# Patient Record
Sex: FEMALE | Race: WHITE | Employment: UNEMPLOYED | URBAN - METROPOLITAN AREA
[De-identification: names, ages, dates, MRNs, and addresses within clinical notes are randomized per-mention and may not be internally consistent; named-entity substitution may affect disease eponyms.]

---

## 2017-01-01 ENCOUNTER — HOSPITAL ENCOUNTER (OUTPATIENT)
Dept: OTHER | Age: 52
Discharge: HOME | End: 2017-01-31
Attending: ANESTHESIOLOGY | Admitting: ANESTHESIOLOGY

## 2017-01-06 ENCOUNTER — HOSPITAL ENCOUNTER (OUTPATIENT)
Dept: PHYSICAL THERAPY | Age: 52
Discharge: HOME | End: 2017-01-06

## 2017-01-18 ENCOUNTER — HOSPITAL ENCOUNTER (OUTPATIENT)
Dept: PHYSICAL THERAPY | Age: 52
Discharge: HOME | End: 2017-01-18

## 2017-01-24 ENCOUNTER — HOSPITAL ENCOUNTER (OUTPATIENT)
Dept: PHYSICAL THERAPY | Age: 52
Discharge: HOME | End: 2017-01-24

## 2017-01-31 ENCOUNTER — HOSPITAL ENCOUNTER (OUTPATIENT)
Dept: PHYSICAL THERAPY | Age: 52
Discharge: HOME | End: 2017-01-31

## 2017-02-01 ENCOUNTER — HOSPITAL ENCOUNTER (OUTPATIENT)
Dept: OTHER | Age: 52
Discharge: HOME | End: 2017-02-28
Attending: ANESTHESIOLOGY | Admitting: ANESTHESIOLOGY

## 2017-02-03 ENCOUNTER — HOSPITAL ENCOUNTER (OUTPATIENT)
Dept: PHYSICAL THERAPY | Age: 52
Discharge: HOME | End: 2017-02-03

## 2017-03-01 ENCOUNTER — HOSPITAL ENCOUNTER (OUTPATIENT)
Dept: OTHER | Age: 52
Discharge: HOME | End: 2017-03-31
Attending: ANESTHESIOLOGY | Admitting: ANESTHESIOLOGY

## 2017-03-29 ENCOUNTER — HOSPITAL ENCOUNTER (OUTPATIENT)
Dept: PHYSICAL THERAPY | Age: 52
Discharge: HOME | End: 2017-03-29

## 2017-03-31 ENCOUNTER — HOSPITAL ENCOUNTER (OUTPATIENT)
Dept: PHYSICAL THERAPY | Age: 52
Discharge: HOME | End: 2017-03-31

## 2017-04-05 ENCOUNTER — HOSPITAL ENCOUNTER (OUTPATIENT)
Dept: PHYSICAL THERAPY | Age: 52
Discharge: HOME | End: 2017-04-05

## 2017-04-07 ENCOUNTER — HOSPITAL ENCOUNTER (OUTPATIENT)
Dept: PHYSICAL THERAPY | Age: 52
Discharge: HOME | End: 2017-04-07

## 2017-04-12 ENCOUNTER — HOSPITAL ENCOUNTER (OUTPATIENT)
Dept: PHYSICAL THERAPY | Age: 52
Discharge: HOME | End: 2017-04-12

## 2017-04-21 ENCOUNTER — HOSPITAL ENCOUNTER (OUTPATIENT)
Dept: PHYSICAL THERAPY | Age: 52
Discharge: HOME | End: 2017-04-21
Admitting: ANESTHESIOLOGY

## 2017-04-26 ENCOUNTER — HOSPITAL ENCOUNTER (OUTPATIENT)
Dept: PHYSICAL THERAPY | Age: 52
Discharge: HOME | End: 2017-04-26
Admitting: ANESTHESIOLOGY

## 2017-04-28 ENCOUNTER — HOSPITAL ENCOUNTER (OUTPATIENT)
Dept: PHYSICAL THERAPY | Age: 52
Discharge: HOME | End: 2017-04-28
Admitting: ANESTHESIOLOGY

## 2017-05-01 ENCOUNTER — HOSPITAL ENCOUNTER (OUTPATIENT)
Dept: OTHER | Age: 52
Discharge: HOME | End: 2017-05-31
Attending: ANESTHESIOLOGY | Admitting: ANESTHESIOLOGY

## 2017-05-05 ENCOUNTER — HOSPITAL ENCOUNTER (OUTPATIENT)
Dept: PHYSICAL THERAPY | Age: 52
Discharge: HOME | End: 2017-05-05

## 2017-05-31 ENCOUNTER — HOSPITAL ENCOUNTER (OUTPATIENT)
Dept: PHYSICAL THERAPY | Age: 52
Discharge: HOME | End: 2017-05-31

## 2017-06-06 ENCOUNTER — HOSPITAL ENCOUNTER (OUTPATIENT)
Dept: PHYSICAL THERAPY | Age: 52
Discharge: HOME | End: 2017-06-06

## 2017-06-13 ENCOUNTER — HOSPITAL ENCOUNTER (OUTPATIENT)
Dept: PHYSICAL THERAPY | Age: 52
Discharge: HOME | End: 2017-06-13

## 2017-06-16 ENCOUNTER — HOSPITAL ENCOUNTER (OUTPATIENT)
Dept: PHYSICAL THERAPY | Age: 52
Discharge: HOME | End: 2017-06-16

## 2017-06-21 ENCOUNTER — HOSPITAL ENCOUNTER (OUTPATIENT)
Dept: PHYSICAL THERAPY | Age: 52
Discharge: HOME | End: 2017-06-21

## 2017-06-23 ENCOUNTER — HOSPITAL ENCOUNTER (OUTPATIENT)
Dept: PHYSICAL THERAPY | Age: 52
Discharge: HOME | End: 2017-06-23
Admitting: ANESTHESIOLOGY

## 2017-06-27 ENCOUNTER — HOSPITAL ENCOUNTER (OUTPATIENT)
Dept: PHYSICAL THERAPY | Age: 52
Discharge: HOME | End: 2017-06-27

## 2017-07-01 ENCOUNTER — HOSPITAL ENCOUNTER (OUTPATIENT)
Dept: OTHER | Age: 52
Discharge: HOME | End: 2017-07-31
Attending: ANESTHESIOLOGY | Admitting: ANESTHESIOLOGY

## 2017-07-05 ENCOUNTER — HOSPITAL ENCOUNTER (OUTPATIENT)
Dept: PHYSICAL THERAPY | Age: 52
Discharge: HOME | End: 2017-07-05

## 2017-07-07 ENCOUNTER — HOSPITAL ENCOUNTER (OUTPATIENT)
Dept: PHYSICAL THERAPY | Age: 52
Discharge: HOME | End: 2017-07-07

## 2017-08-01 ENCOUNTER — HOSPITAL ENCOUNTER (OUTPATIENT)
Dept: PHYSICAL THERAPY | Age: 52
Discharge: HOME | End: 2017-08-01

## 2017-08-08 ENCOUNTER — HOSPITAL ENCOUNTER (OUTPATIENT)
Dept: PHYSICAL THERAPY | Age: 52
Discharge: HOME | End: 2017-08-08

## 2017-08-11 ENCOUNTER — HOSPITAL ENCOUNTER (OUTPATIENT)
Dept: PHYSICAL THERAPY | Age: 52
Discharge: HOME | End: 2017-08-11

## 2017-08-14 ENCOUNTER — HOSPITAL ENCOUNTER (OUTPATIENT)
Dept: PHYSICAL THERAPY | Age: 52
Discharge: HOME | End: 2017-08-14

## 2017-08-18 ENCOUNTER — HOSPITAL ENCOUNTER (OUTPATIENT)
Dept: PHYSICAL THERAPY | Age: 52
Discharge: HOME | End: 2017-08-18

## 2017-08-22 ENCOUNTER — HOSPITAL ENCOUNTER (OUTPATIENT)
Dept: PHYSICAL THERAPY | Age: 52
Discharge: HOME | End: 2017-08-22

## 2017-08-25 ENCOUNTER — HOSPITAL ENCOUNTER (OUTPATIENT)
Dept: PHYSICAL THERAPY | Age: 52
Discharge: HOME | End: 2017-08-25

## 2017-09-01 ENCOUNTER — HOSPITAL ENCOUNTER (OUTPATIENT)
Dept: OTHER | Age: 52
Discharge: HOME | End: 2017-09-30
Attending: ANESTHESIOLOGY | Admitting: ANESTHESIOLOGY

## 2017-09-05 ENCOUNTER — HOSPITAL ENCOUNTER (OUTPATIENT)
Dept: PHYSICAL THERAPY | Age: 52
Discharge: HOME | End: 2017-09-05

## 2017-09-19 ENCOUNTER — HOSPITAL ENCOUNTER (OUTPATIENT)
Dept: PHYSICAL THERAPY | Age: 52
Discharge: HOME | End: 2017-09-19

## 2017-09-26 ENCOUNTER — HOSPITAL ENCOUNTER (OUTPATIENT)
Dept: PHYSICAL THERAPY | Age: 52
Discharge: HOME | End: 2017-09-26

## 2017-11-01 ENCOUNTER — HOSPITAL ENCOUNTER (OUTPATIENT)
Dept: OTHER | Age: 52
Discharge: HOME | End: 2017-11-30
Attending: ANESTHESIOLOGY | Admitting: ANESTHESIOLOGY

## 2017-11-03 ENCOUNTER — HOSPITAL ENCOUNTER (OUTPATIENT)
Dept: PHYSICAL THERAPY | Age: 52
Discharge: HOME | End: 2017-11-03

## 2017-11-03 NOTE — PROGRESS NOTES
Outpatient Physical Therapy     [x] Daily Treatment Note   [] Progress Note -   , 17 . 17,  10/6/17, 11/3/17   Bottom of this page. [] Discharge Note      Date:  11/3/2017        Patient Name:  Leo Taylor        :  1965    Restrictions/Precautions:      Diagnosis:  Complex pain syndrome. Treatment Diagnosis:  Same, RSD, decreased ability to walk  Insurance/Certification information:  [facility] has approval for 8 weeks- 16 visits                                                          Referring Physician:  Dr. Kandi Mirza of care signed (Y/N):      Visit# / total visits:              Authorized from:    10/17/17-            Pain level: 5-6/10 neck when lying down      10 neck, with walking, ADLs       6/10  Constant left arm(entire  Arm)    Subjective: 11/3/17 pt returns to therapy today. Has approval for 24 visit s. Pain level is high today. 10/6/17 reports that she is more sore all over- perhaps due to the poor weather moving in.   10/3/17 her mouth is much better. She has 1 more approved visit after today- will wait on prog note til that day. 17 her mouth is still very sore but slowly improving  17 states she fell walking in the grass yesterday but was able to get up herself. Has had upper teeth extracted- very sore mouth  6/10 pain scale. 17 doing OK . About the sa ME. Going to the dentis today,having her teeth pulled. Thirsty and hungry from no food or water since midnight. 17 feeling better. No c/o sharp neck pain. 17 still having some sharp pain from her left shoulder up her neck but not daily. She has been to the dentist. (pleased)  17 having nichole[p pain from her left shoulder up to her neck yesterday. No reason. Severe stormy weekend ahead- weather? Have also been doing more cervical strengthening for posture and gait. Pain   Patient describes pain to be in her left arm, upper back and neck.   Patient reports Pain scale 4-6/10  . She has an implanted TENS unit for pain. Worsened by - stays the same most of the time  Improved by - lying on the couch     Social History/Environment:   Patient lives with .her sister  . . in a 1 story house with 1 step in the garage    Prior Level of Function:    has been very limited for the past 5 years. Has help with bathing. She is able to dress herself. She is able to walk about 20 ft with a walker. Sits in a recliner. Uses a walker to get to the bathroom.      Functional Complaints/ Current Level of Function:   Patient unable to .walk very far, states she has poor balance.      Patient goal for therapy:        11/3/17Be able to hold my head up more and walk better and not turn my foot in as much          Exercises:   Exercise/Equipment Resistance/Repetitions Other comments    12/19/16 cerv rot left  x5      Head,sh, elbow presses Hold 5 sec  X 5      Ankle- DF/PF  INV/EV 2x10     12/21/16 bridges           SLR       Dorsi flx       Heel raise     12/28/16 seated- core work- upper andlower trunk, cue for head position constantly , sit to stand, unlock the left knee, shift to the left 30 min      Manual- see below 15 min      gait   15 min  Attempted st cane- not able due to fear of falling Will work in llbars   Left foot- seated active eversion    2x10 and against resistance. 1/2/17-1/6/17 gait-llbars    30 min Foot placement, posture, head position, wt shift Feet apart, left foot out.     1/10/17-1/13/17 gait in ll bars 15 min          SLR, abd, heel raise 3x5 work on midline with head and left toe position. scap retraction, depression Sets of 5 x 5      Side step ups-left 2x10      1/24/17 sidelying left cerv side lift, abd of hip, and eversion of left ankle All 3x 5     3/31/17-4/14/17 forward and lateral step ups to the left   x30 with cues for wt shift, able to step forward without hands x 5 reps.  Working on shifting wt left   Standing side leg raise and backwd rsies 3x5 4/5/17 bridges, bicycle legs, single leg bridge 2x10 each, given HO for HEP    4/19/17 outdoor walking on concrete, gravel,grass 300 ft x 2. SB asst, no cane. Only used cane on the gravel and grass (20 ft) A few stops to get centered and reposition head   4/28/17 combined step ups- 4-6 8 inch forward and step up15 min  and overs without UE support, doing saumya pad steps Working on wt shift to the left. Seated on therapy ball-wt shift, knee lift, low kick, arm raise, trunk co-contraction x10-20 reps each    Ambulating on the sidewalk 200 ft            Left foot eversion with red 3x10    5/5/17 HEP: turn circles in 4 sec, single stand, tandem stand 2x day   gvien HO     11/3/17 step ups- no hands6 in  3 in a row, many times      Turn her left foot into eversion Sets of 10 x 3    sidelying -lateral neck lifts 3x10    Prone cerv ext 3x10    Prone leg ext, then with R arm raise 3x10    Prone plank NV     . Prone knee flx to stretch ant thigh Hold 10 sec  5-7 each leg. Left more limited. Prone 4 pt   Elbows ad knees 2x10    High kneeling to stand bialt                  Therapeutic Exercise: 30 min closed chain, prone ex                   1/24/17  30 min. PNF LE diagonals with left leg. Worked on ER, stretching. Group Therapy:      Home Exercise Program:       Therapeutic Activity:      Neuromuscular Re-education:  15 min balance     e.  3/29/17 Mir balance test performed. Score= 47 ( improved 3 points). 41-50 range is low risk of falling. 21-40 is moderate risk. Pt scored 16/28 on the second page of skills (higher level)    Gait:    10/6/17    min   Working on posture, head position,  and foot postiion. Side to side, without AD.     amaabulating in sock feet- performs HS with her left but does not get to FF in order to push off her toe, instead her toes flex and her foot is drawn into inversion. Will work more in her sock feet. Fast walking in ll bars, mechnaics of jumping in ll bars. Walking Subjective:      she is ambulating without her cane indoors. She is able to walk inside stores with her cane for short trips. She is going to try to use the grocery cart at the store instead of the w/c or electric cart.  (shopping cart at [employer], electric cart at [employer])  States she would like to have better posture with her head( constant side flexed and rot R)  She would like to be more independent with her own grooming (fixing her hair) and medications. Objective:  11/3/17 improving head position and left foot position but her abnormal tone in her left foot overrides her ability to control it. I believe she will benefit from an AFO to make her more stable and independent. appled tape to her cervical spine today to offer a cue for imporved head palcment- appeared beneficial.           9/1/17 improving head osition and left foot position- still requires many cues- verble and tactile for good walking posture, othewise returns to left foot inversion and cerv rot R/SB. 11/3/17 murphy balance test: 50/56. Low risk of falls. Scores low on tandem stand and single leg stand,  Has poor head position with walking (rot R and SB R), inversion of her left foot both due to the RSD. Minimal to no use of her left arm. Assessment:  G Code: [id number]   Summary:  .  Just starting therapy again after a 1 month break,  Patient's response to treatment: good. Works extremely hard,  She is pleased with her progress. .. She would like to eventually return to her own home in the near future. Goals:  G code :    Short Term Goals ( 4 weeks) Long Term Goals ( 8 weeks)   1). Establish HEP 1). (I) HEP   2). imporive cervical position 35% 2). improve cervical position  75%   3). cervical strength to 4- 3). cervical strength to 4+   4). tandem stand left 30 sec  Right 20 sec. Single stnd 5 sec 4). good balance. Murphy score to 53/56   5). Ambulate for 10 min continuously 5). ambulate continuously for 20 min   6). Fit with AFO 6). increase activiy  75%         Progress toward previous goals:    STG 1,2, 4 (partially)5 met. LTG  Not met. ·   ·   · Plan:    Continue 2x wk for 24 visits.     Electronically Signed by: Zoila Good, [id number]

## 2017-11-07 ENCOUNTER — HOSPITAL ENCOUNTER (OUTPATIENT)
Dept: PHYSICAL THERAPY | Age: 52
Discharge: HOME | End: 2017-11-07

## 2017-11-07 NOTE — FLOWSHEET NOTE
neck.  Patient reports Pain scale 4-6/10  . She has an implanted TENS unit for pain. Worsened by - stays the same most of the time  Improved by - lying on the couch     Social History/Environment:   Patient lives with .her sister  . . in a 1 story house with 1 step in the garage    Prior Level of Function:    has been very limited for the past 5 years. Has help with bathing. She is able to dress herself. She is able to walk about 20 ft with a walker. Sits in a recliner. Uses a walker to get to the bathroom.      Functional Complaints/ Current Level of Function:   Patient unable to .walk very far, states she has poor balance.      Patient goal for therapy:        11/3/17Be able to hold my head up more and walk better and not turn my foot in as much          Exercises:   Exercise/Equipment Resistance/Repetitions Other comments    12/19/16 cerv rot left  x5      Head,sh, elbow presses Hold 5 sec  X 5      Ankle- DF/PF  INV/EV 2x10     12/21/16 bridges           SLR       Dorsi flx       Heel raise     12/28/16 seated- core work- upper andlower trunk, cue for head position constantly , sit to stand, unlock the left knee, shift to the left 30 min      Manual- see below 15 min      gait   15 min  Attempted st cane- not able due to fear of falling Will work in llbars   Left foot- seated active eversion    2x10 and against resistance. 1/2/17-1/6/17 gait-llbars    30 min Foot placement, posture, head position, wt shift Feet apart, left foot out.     1/10/17-1/13/17 gait in ll bars 15 min          SLR, abd, heel raise 3x5 work on midline with head and left toe position. scap retraction, depression Sets of 5 x 5      Side step ups-left 2x10      1/24/17 sidelying left cerv side lift, abd of hip, and eversion of left ankle All 3x 5     3/31/17-4/14/17 forward and lateral step ups to the left   x30 with cues for wt shift, able to step forward without hands x 5 reps.  Working on shifting wt left   Standing side leg raise and backwd rsies 3x5    4/5/17 bridges, bicycle legs, single leg bridge 2x10 each, given HO for HEP    4/19/17 outdoor walking on concrete, gravel,grass 300 ft x 2. SB asst, no cane. Only used cane on the gravel and grass (20 ft) A few stops to get centered and reposition head   4/28/17 combined step ups- 4-6 8 inch forward and step up15 min  and overs without UE support, doing saumya pad steps Working on wt shift to the left. Seated on therapy ball-wt shift, knee lift, low kick, arm raise, trunk co-contraction x10-20 reps each    Ambulating on the sidewalk 200 ft            Left foot eversion with red 3x10    5/5/17 HEP: turn circles in 4 sec, single stand, tandem stand 2x day   gvien HO     11/3/17 step ups- no hands6 in  3 in a row, many times      Turn her left foot into eversion Sets of 10 x 3    sidelying -lateral neck lifts 3x10    Prone cerv ext 3x10    Prone leg ext, then with R arm raise 3x10    Prone plank NV     . Prone knee flx to stretch ant thigh Hold 10 sec  5-7 each leg. Left more limited. Prone 4 pt   Elbows ad knees 2x10    High kneeling to stand bialt                  Therapeutic Exercise:  30 min closed chain, prone ex                   1/24/17  30 min. PNF LE diagonals with left leg. Worked on ER, stretching. Group Therapy:      Home Exercise Program:       Therapeutic Activity:      Neuromuscular Re-education:        e.  3/29/17 Mri balance test performed. Score= 47 ( improved 3 points). 41-50 range is low risk of falling. 21-40 is moderate risk. Pt scored 16/28 on the second page of skills (higher level)    Gait:    min   Working on posture, head position,  and foot postiion. Side to side, without AD.     amaabulating in sock feet- performs HS with her left but does not get to FF in order to push off her toe, instead her toes flex and her foot is drawn into inversion. Will work more in her sock feet.     Fast walking in ll bars, mechnaics of jumping in ll bars.Walking without cane- imporved head and pelvis position. Used eversion dynamic strap to help inversion position. 17 15 min working on asymmetry of pelvis and shoudlers- her left hip is retracted with her upper trunk and shoulders rotated to the right with cervical rot to the R.      Manual Therapy:     17     30min costotransv jt mobiliz bilat in supine with pistol tech,cervical distraction, stretcing, mobiliz for rot, M-F release for left trunk and axilla.,,         17  30 min manual cerv distraction, stretching, PROM, 1st rib, thoracic stretch,  sttretcing and M-F release with sidelying over the red therapy ball. .        Towel roll stretch x 3 horiz and 1 verticle       Modalities:      Timed Code Treatment Minutes:             60    Total Treatment Minutes:              70         Treatment/Activity Tolerance:    [x] Patient tolerated treatment well [] Patient limited by fatigue   [x] Patient limited by pain- left shoulder and elbow/forearm. [x] Patient limited by other medical complications-RSD  [] Other:     Prognosis: [x] Good [] Fair  [] Poor    Patient Requires Follow-up:  [x] Yes  [] No    Plan: [x] Continue per plan of care -    [] Alter current plan (see comments)   [] Plan of care initiated [] Hold pending MD visit [] Discharge          See Progress Note: [x] Yes  [] No       Next due:           Electronically signed by:  Helen Parekh [address] [place] [address]      Outpatient Physical Therapy  Progress Note    Date:  2017    Patient Name:  Tulio Weston      :  1965    Restrictions/Precautions:      Diagnosis:  Complex pain syndrome    Treatment Diagnosis:  Same, decreased ability to walk, poor balance, weakness    Insurance/Certification information: [facility]   24 visits approved       Referring Physician:  Dr. Wilfredo Escobar of care signed (Y/N):      Visit# / total visits:           Pain level:  5 /10 left arm.     Progress Note covers period from:   11/3/17 To date on

## 2017-11-14 ENCOUNTER — HOSPITAL ENCOUNTER (OUTPATIENT)
Dept: PHYSICAL THERAPY | Age: 52
Discharge: HOME | End: 2017-11-14

## 2017-11-14 NOTE — FLOWSHEET NOTE
posture and gait. Pain   Patient describes pain to be in her left arm, upper back and neck. Patient reports Pain scale 4-6/10  . She has an implanted TENS unit for pain. Worsened by - stays the same most of the time  Improved by - lying on the couch     Social History/Environment:   Patient lives with .her sister  . . in a 1 story house with 1 step in the garage    Prior Level of Function:    has been very limited for the past 5 years. Has help with bathing. She is able to dress herself. She is able to walk about 20 ft with a walker. Sits in a recliner. Uses a walker to get to the bathroom.      Functional Complaints/ Current Level of Function:   Patient unable to .walk very far, states she has poor balance.      Patient goal for therapy:        11/3/17Be able to hold my head up more and walk better and not turn my foot in as much          Exercises:   Exercise/Equipment Resistance/Repetitions Other comments    12/19/16 cerv rot left  x5      Head,sh, elbow presses Hold 5 sec  X 5      Ankle- DF/PF  INV/EV 2x10     12/21/16 bridges           SLR       Dorsi flx       Heel raise     12/28/16 seated- core work- upper andlower trunk, cue for head position constantly , sit to stand, unlock the left knee, shift to the left 30 min      Manual- see below 15 min      gait   15 min  Attempted st cane- not able due to fear of falling Will work in llbars   Left foot- seated active eversion    2x10 and against resistance. 1/2/17-1/6/17 gait-llbars    30 min Foot placement, posture, head position, wt shift Feet apart, left foot out.     1/10/17-1/13/17 gait in ll bars 15 min          SLR, abd, heel raise 3x5 work on midline with head and left toe position.         scap retraction, depression Sets of 5 x 5      Side step ups-left 2x10      1/24/17 sidelying left cerv side lift, abd of hip, and eversion of left ankle All 3x 5     3/31/17-4/14/17 forward and lateral step ups to the left   x30 with cues for wt shift, able to step forward without hands x 5 reps. Working on shifting wt left   Standing side leg raise and backwd rsies 3x5    4/5/17 bridges, bicycle legs, single leg bridge 2x10 each, given HO for HEP    4/19/17 outdoor walking on concrete, gravel,grass 300 ft x 2. SB asst, no cane. Only used cane on the gravel and grass (20 ft) A few stops to get centered and reposition head   4/28/17 combined step ups- 4-6 8 inch forward and step up15 min  and overs without UE support, doing saumya pad steps Working on wt shift to the left. Seated on therapy ball-wt shift, knee lift, low kick, arm raise, trunk co-contraction x10-20 reps each    Ambulating on the sidewalk 200 ft            Left foot eversion with red 3x10    5/5/17 HEP: turn circles in 4 sec, single stand, tandem stand 2x day   gvien HO     11/3/17 step ups- no hands6 in  3 in a row, many times      Turn her left foot into eversion Sets of 10 x 3    sidelying -lateral neck lifts 3x10    Prone cerv ext 3x10    Prone leg ext, then with R arm raise 3x10    Prone plank NV     . Prone knee flx to stretch ant thigh Hold 10 sec  5-7 each leg. Left more limited. Prone 4 pt   Elbows ad knees 2x10    High kneeling to stand bialt                  Therapeutic Exercise:   15 min  , prone ex                   1/24/17  30 min. PNF LE diagonals with left leg. Worked on ER, stretching. Group Therapy:      Home Exercise Program:       Therapeutic Activity:      Neuromuscular Re-education:        e.  3/29/17 Mir balance test performed. Score= 47 ( improved 3 points). 41-50 range is low risk of falling. 21-40 is moderate risk. Pt scored 16/28 on the second page of skills (higher level)    Gait:   15 min   Working on posture, head position,  and foot postiion.  Side to side, circles, forw and backwd,without AD.     amaabulating in sock feet- performs HS with her left but does not get to FF in order to push off her toe, instead her toes flex and her foot is drawn into inversion. Will work more in her sock feet. Fast walking in ll bars, mechnaics of jumping in ll bars. Walking without cane- imporved head and pelvis position. Used eversion dynamic strap to help inversion position. 17 15 min working on asymmetry of pelvis and shoudlers- her left hip is retracted with her upper trunk and shoulders rotated to the right with cervical rot to the R.      Manual Therapy:     17     30min costotransv jt mobiliz bilat in supine with pistol tech,cervical distraction, stretcing, mobiliz for rot, M-F release for left trunk and axilla.,,         17  30 min manual cerv distraction, stretching, PROM, 1st rib, thoracic stretch,  sttretcing and M-F release with sidelying over the red therapy ball. .        Towel roll stretch x 3 horiz and 1 verticle       Modalities:      Timed Code Treatment Minutes:  60              Total Treatment Minutes:   65         Treatment/Activity Tolerance:    [x] Patient tolerated treatment well [] Patient limited by fatigue   [x] Patient limited by pain- left shoulder and elbow/forearm.  [x] Patient limited by other medical complications-RSD  [] Other:     Prognosis: [x] Good [] Fair  [] Poor    Patient Requires Follow-up:  [x] Yes  [] No    Plan: [x] Continue per plan of care -    [] Alter current plan (see comments)   [] Plan of care initiated [] Hold pending MD visit [] Discharge          See Progress Note: [] Yes  [x] No       Next due:      17     Electronically signed by:  Betsy Matt, PT [id number]          Date:  2017    Patient Name:  Zenobia Lancaster      :  1965    Restrictions/Precautions:      Diagnosis:  Complex pain syndrome    Treatment Diagnosis:  Same, decreased ability to walk, poor balance, weakness    Insurance/Certification information: [facility]   24 visits approved       Referring Physician:  Dr. Wilfred Fang of care signed (Y/N):      Visit# / total visits:           Pain level:  5 /10 left

## 2017-11-24 ENCOUNTER — HOSPITAL ENCOUNTER (OUTPATIENT)
Dept: PHYSICAL THERAPY | Age: 52
Discharge: HOME | End: 2017-11-24
Admitting: ANESTHESIOLOGY

## 2017-11-24 NOTE — FLOWSHEET NOTE
toes flex and her foot is drawn into inversion. Will work more in her sock feet. Fast walking in ll bars, mechnaics of jumping in ll bars. Walking without cane- imporved head and pelvis position. Used eversion dynamic strap to help inversion position. 17 15 min working on asymmetry of pelvis and shoudlers- her left hip is retracted with her upper trunk and shoulders rotated to the right with cervical rot to the R.      Manual Therapy:     17-17     30min costotransv jt mobiliz bilat in supine with pistol tech,cervical distraction, stretcing, mobiliz for rot, M-F release for left trunk and axilla.,,         17  30 min manual cerv distraction, stretching, PROM, 1st rib, thoracic stretch,  sttretcing and M-F release with sidelying over the red therapy ball. .        Towel roll stretch x 3 horiz and 1 verticle       Modalities:      Timed Code Treatment Minutes:   65             Total Treatment Minutes:   70         Treatment/Activity Tolerance:    [x] Patient tolerated treatment well [] Patient limited by fatigue   [x] Patient limited by pain- left shoulder and elbow/forearm.  [x] Patient limited by other medical complications-RSD  [] Other:     Prognosis: [x] Good [] Fair  [] Poor    Patient Requires Follow-up:  [x] Yes  [] No    Plan: [x] Continue per plan of care -    [] Alter current plan (see comments)   [] Plan of care initiated [] Hold pending MD visit [] Discharge          See Progress Note: [] Yes  [x] No       Next due:      17     Electronically signed by:  Анна Mckeon, PT [id number]          Date:  2017    Patient Name:  Felix Sandy      :  1965    Restrictions/Precautions:      Diagnosis:  Complex pain syndrome    Treatment Diagnosis:  Same, decreased ability to walk, poor balance, weakness    Insurance/Certification information: [facility]   24 visits approved       Referring Physician:  Dr. Anne Tabares of care signed (Y/N):      Visit# / total visits: 1/24    Pain level:  5 /10 left arm. Progress Note covers period from:   11/3/17 To date on this note. Subjective:      she is ambulating without her cane indoors. She is able to walk inside stores with her cane for short trips. She is going to try to use the grocery cart at the store instead of the w/c or electric cart.  (shopping cart at [employer], electric cart at [employer])  States she would like to have better posture with her head( constant side flexed and rot R)  She would like to be more independent with her own grooming (fixing her hair) and medications. Objective:  11/3/17 improving head position and left foot position but her abnormal tone in her left foot overrides her ability to control it. I believe she will benefit from an AFO to make her more stable and independent. appled tape to her cervical spine today to offer a cue for imporved head palcment- appeared beneficial.           9/1/17 improving head osition and left foot position- still requires many cues- verble and tactile for good walking posture, othewise returns to left foot inversion and cerv rot R/SB. 11/3/17 murphy balance test: 50/56. Low risk of falls. Scores low on tandem stand and single leg stand,  Has poor head position with walking (rot R and SB R), inversion of her left foot both due to the RSD. Minimal to no use of her left arm. Assessment:  G Code: [id number] CJ  Summary:  .  Just starting therapy again after a 1 month break,  Patient's response to treatment: good. Works extremely hard,  She is pleased with her progress. .. She would like to eventually return to her own home in the near future. Goals:  G code :  CJ  Short Term Goals ( 4 weeks) Long Term Goals ( 8 weeks)   1). Establish HEP 1). (I) HEP   2). imporive cervical position 35% 2). improve cervical position  75%   3). cervical strength to 4- 3). cervical strength to 4+   4). tandem stand left 30 sec  Right 20 sec.

## 2017-11-24 NOTE — FLOWSHEET NOTE
Outpatient Physical Therapy     [x] Daily Treatment Note   [] Progress Note -   , 17 . 17,  10/6/17, 11/3/17   Bottom of this page. [] Discharge Note      Date:  2017        Patient Name:  Tulio Weston        :  1965    Restrictions/Precautions:      Diagnosis:  Complex pain syndrome. Treatment Diagnosis:  Same, RSD, decreased ability to walk  Insurance/Certification information:  [facility] has approval for 8 weeks- 16 visits                                                          Referring Physician:  Dr. Gallegos Base of care signed (Y/N):      Visit# / total visits:              Authorized from:    10/17/17-            Pain level: 5-6/10 neck when lying down      6/10 neck, with walking, ADLs       5/10  Constant left arm(entire  Arm)    Subjective: 17 pain level is about the same. 17 doing OK  17 doing OK today, her left arm is a little less painful than on 11/3. Pain scale 6/10  11/7/17 doing well today. 11/3/17 pt returns to therapy today. Has approval for 24 visit s. Pain level is high today. 10/6/17 reports that she is more sore all over- perhaps due to the poor weather moving in.   10/3/17 her mouth is much better. She has 1 more approved visit after today- will wait on prog note til that day. 17 her mouth is still very sore but slowly improving  17 states she fell walking in the grass yesterday but was able to get up herself. Has had upper teeth extracted- very sore mouth  6/10 pain scale. 17 doing OK . About the sa ME. Going to the dentis today,having her teeth pulled. Thirsty and hungry from no food or water since midnight. 17 feeling better. No c/o sharp neck pain. 17 still having some sharp pain from her left shoulder up her neck but not daily. She has been to the dentist. (pleased)  17 having nichole[p pain from her left shoulder up to her neck yesterday. No reason.   Severe stormy weekend ahead- weather? Have also been doing more cervical strengthening for posture and gait. Pain   Patient describes pain to be in her left arm, upper back and neck. Patient reports Pain scale 4-6/10  . She has an implanted TENS unit for pain. Worsened by - stays the same most of the time  Improved by - lying on the couch     Social History/Environment:   Patient lives with .her sister  . . in a 1 story house with 1 step in the garage    Prior Level of Function:    has been very limited for the past 5 years. Has help with bathing. She is able to dress herself. She is able to walk about 20 ft with a walker. Sits in a recliner. Uses a walker to get to the bathroom.      Functional Complaints/ Current Level of Function:   Patient unable to .walk very far, states she has poor balance.      Patient goal for therapy:        11/3/17Be able to hold my head up more and walk better and not turn my foot in as much          Exercises:   Exercise/Equipment Resistance/Repetitions Other comments    12/19/16 cerv rot left  x5      Head,sh, elbow presses Hold 5 sec  X 5      Ankle- DF/PF  INV/EV 2x10     12/21/16 bridges           SLR       Dorsi flx       Heel raise     12/28/16 seated- core work- upper andlower trunk, cue for head position constantly , sit to stand, unlock the left knee, shift to the left 30 min      Manual- see below 15 min      gait   15 min  Attempted st cane- not able due to fear of falling Will work in llbars   Left foot- seated active eversion    2x10 and against resistance. 1/2/17-1/6/17 gait-llbars    30 min Foot placement, posture, head position, wt shift Feet apart, left foot out.     1/10/17-1/13/17 gait in ll bars 15 min          SLR, abd, heel raise 3x5 work on midline with head and left toe position.         scap retraction, depression Sets of 5 x 5      Side step ups-left 2x10      1/24/17 sidelying left cerv side lift, abd of hip, and eversion of left ankle All 3x 5     3/31/17-4/14/17 foot is drawn into inversion. Will work more in her sock feet. Fast walking in ll bars, mechnaics of jumping in ll bars. Walking without cane- imporved head and pelvis position. Used eversion dynamic strap to help inversion position. 17 15 min working on asymmetry of pelvis and shoudlers- her left hip is retracted with her upper trunk and shoulders rotated to the right with cervical rot to the R.      Manual Therapy:     17-17     30min costotransv jt mobiliz bilat in supine with pistol tech,cervical distraction, stretcing, mobiliz for rot, M-F release for left trunk and axilla.,,         17  30 min manual cerv distraction, stretching, PROM, 1st rib, thoracic stretch,  sttretcing and M-F release with sidelying over the red therapy ball. .        Towel roll stretch x 3 horiz and 1 verticle       Modalities:      Timed Code Treatment Minutes:  60              Total Treatment Minutes:   65         Treatment/Activity Tolerance:    [x] Patient tolerated treatment well [] Patient limited by fatigue   [x] Patient limited by pain- left shoulder and elbow/forearm.  [x] Patient limited by other medical complications-RSD  [] Other:     Prognosis: [x] Good [] Fair  [] Poor    Patient Requires Follow-up:  [x] Yes  [] No    Plan: [x] Continue per plan of care -    [] Alter current plan (see comments)   [] Plan of care initiated [] Hold pending MD visit [] Discharge          See Progress Note: [] Yes  [x] No       Next due:      17     Electronically signed by:  Glen Ku, PT [id number]          Date:  2017    Patient Name:  Ange Barry      :  1965    Restrictions/Precautions:      Diagnosis:  Complex pain syndrome    Treatment Diagnosis:  Same, decreased ability to walk, poor balance, weakness    Insurance/Certification information: [facility]   24 visits approved       Referring Physician:  Dr. Daniel Pedersen of care signed (Y/N):      Visit# / total visits:           Pain level:  5 /10 left arm. Progress Note covers period from:   11/3/17 To date on this note. Subjective:      she is ambulating without her cane indoors. She is able to walk inside stores with her cane for short trips. She is going to try to use the grocery cart at the store instead of the w/c or electric cart.  (shopping cart at [employer], electric cart at [employer])  States she would like to have better posture with her head( constant side flexed and rot R)  She would like to be more independent with her own grooming (fixing her hair) and medications. Objective:  11/3/17 improving head position and left foot position but her abnormal tone in her left foot overrides her ability to control it. I believe she will benefit from an AFO to make her more stable and independent. appled tape to her cervical spine today to offer a cue for imporved head palcment- appeared beneficial.           9/1/17 improving head osition and left foot position- still requires many cues- verble and tactile for good walking posture, othewise returns to left foot inversion and cerv rot R/SB. 11/3/17 murphy balance test: 50/56. Low risk of falls. Scores low on tandem stand and single leg stand,  Has poor head position with walking (rot R and SB R), inversion of her left foot both due to the RSD. Minimal to no use of her left arm. Assessment:  G Code: [id number]   Summary:  .  Just starting therapy again after a 1 month break,  Patient's response to treatment: good. Works extremely hard,  She is pleased with her progress. .. She would like to eventually return to her own home in the near future. Goals:  G code :    Short Term Goals ( 4 weeks) Long Term Goals ( 8 weeks)   1). Establish HEP 1). (I) HEP   2). imporive cervical position 35% 2). improve cervical position  75%   3). cervical strength to 4- 3). cervical strength to 4+   4). tandem stand left 30 sec  Right 20 sec.    Single stnd 5 sec 4).good balance. Mir score to 53/56   5). Ambulate for 10 min continuously 5). ambulate continuously for 20 min   6). Fit with AFO 6). increase activiy  75%         Progress toward previous goals:    STG 1,2, 4 (partially)5 met. LTG  Not met. ·   ·   · Plan:    Continue 2x wk for 24 visits.     Electronically Signed by: Mane Mcgarry, [id number]

## 2017-11-28 ENCOUNTER — HOSPITAL ENCOUNTER (OUTPATIENT)
Dept: PHYSICAL THERAPY | Age: 52
Discharge: HOME | End: 2017-11-28
Admitting: ANESTHESIOLOGY

## 2017-11-28 NOTE — FLOWSHEET NOTE
Outpatient Physical Therapy     [x] Daily Treatment Note   [] Progress Note -   , 17 . 17,  10/6/17, 11/3/17   Bottom of this page. [] Discharge Note      Date:  2017        Patient Name:  Edmundo Finney        :  1965    Restrictions/Precautions:      Diagnosis:  Complex pain syndrome. Treatment Diagnosis:  Same, RSD, decreased ability to walk  Insurance/Certification information:  [facility] has approval for 8 weeks- 16 visits                                                          Referring Physician:  Dr. Adam Quezada of care signed (Y/N):      Visit# / total visits:              Authorized from:    10/17/17-            Pain level: 5/10 neck when lying down      5/10 neck, with walking, ADLs       5/10  Constant left arm(entire  Arm)    Subjective:  17 pleased today that she did her own hair  17 doing OK  17 pain level is about the same. 17 doing OK  17 doing OK today, her left arm is a little less painful than on 11/3. Pain scale 6/10  17 doing well today. 11/3/17 pt returns to therapy today. Has approval for 24 visit s. Pain level is high today. 10/6/17 reports that she is more sore all over- perhaps due to the poor weather moving in.   10/3/17 her mouth is much better. She has 1 more approved visit after today- will wait on prog note til that day. 17 her mouth is still very sore but slowly improving  17 states she fell walking in the grass yesterday but was able to get up herself. Has had upper teeth extracted- very sore mouth  6/10 pain scale. 17 doing OK . About the sa ME. Going to the dentis today,having her teeth pulled. Thirsty and hungry from no food or water since midnight. 17 feeling better. No c/o sharp neck pain. 17 still having some sharp pain from her left shoulder up her neck but not daily.   She has been to the dentist. (pleased)  17 having nichole[p pain from her left shoulder up to her neck yesterday. No reason. Severe stormy weekend ahead- weather? Have also been doing more cervical strengthening for posture and gait. Pain   Patient describes pain to be in her left arm, upper back and neck. Patient reports Pain scale 4-6/10  . She has an implanted TENS unit for pain. Worsened by - stays the same most of the time  Improved by - lying on the couch     Social History/Environment:   Patient lives with .her sister  . . in a 1 story house with 1 step in the garage    Prior Level of Function:    has been very limited for the past 5 years. Has help with bathing. She is able to dress herself. She is able to walk about 20 ft with a walker. Sits in a recliner. Uses a walker to get to the bathroom.      Functional Complaints/ Current Level of Function:   Patient unable to .walk very far, states she has poor balance.      Patient goal for therapy:        11/3/17Be able to hold my head up more and walk better and not turn my foot in as much        Exercises:   Exercise/Equipment Resistance/Repetitions Other comments    12/19/16 cerv rot left  x5      Head,sh, elbow presses Hold 5 sec  X 5      Ankle- DF/PF  INV/EV 2x10     12/21/16 bridges           SLR       Dorsi flx       Heel raise     12/28/16 seated- core work- upper andlower trunk, cue for head position constantly , sit to stand, unlock the left knee, shift to the left 30 min      Manual- see below 15 min      gait   15 min  Attempted st cane- not able due to fear of falling Will work in llbars   Left foot- seated active eversion    2x10 and against resistance. 1/2/17-1/6/17 gait-llbars    30 min Foot placement, posture, head position, wt shift Feet apart, left foot out.     1/10/17-1/13/17 gait in ll bars 15 min          SLR, abd, heel raise 3x5 work on midline with head and left toe position.         scap retraction, depression Sets of 5 x 5      Side step ups-left 2x10      1/24/17 sidelying left cerv side lift, abd of hip, and

## 2017-12-01 ENCOUNTER — HOSPITAL ENCOUNTER (OUTPATIENT)
Dept: OTHER | Age: 52
Discharge: HOME | End: 2017-12-31
Attending: ANESTHESIOLOGY | Admitting: ANESTHESIOLOGY

## 2017-12-01 NOTE — FLOWSHEET NOTE
care signed (Y/N):      Visit# / total visits:       1/24    Pain level:  5 /10 left arm. Progress Note covers period from:   11/3/17 To date on this note. Subjective:      she is ambulating without her cane indoors. She is able to walk inside stores with her cane for short trips. She is going to try to use the grocery cart at the store instead of the w/c or electric cart.  (shopping cart at [employer], electric cart at [employer])  States she would like to have better posture with her head( constant side flexed and rot R)  She would like to be more independent with her own grooming (fixing her hair) and medications. Objective:  11/3/17 improving head position and left foot position but her abnormal tone in her left foot overrides her ability to control it. I believe she will benefit from an AFO to make her more stable and independent. appled tape to her cervical spine today to offer a cue for imporved head palcment- appeared beneficial.           9/1/17 improving head osition and left foot position- still requires many cues- verble and tactile for good walking posture, othewise returns to left foot inversion and cerv rot R/SB. 11/3/17 murphy balance test: 50/56. Low risk of falls. Scores low on tandem stand and single leg stand,  Has poor head position with walking (rot R and SB R), inversion of her left foot both due to the RSD. Minimal to no use of her left arm. Assessment:  G Code: [id number] CJ  Summary:  .  Just starting therapy again after a 1 month break,  Patient's response to treatment: good. Works extremely hard,  She is pleased with her progress. .. She would like to eventually return to her own home in the near future. Goals:  G code :    Short Term Goals ( 4 weeks) Long Term Goals ( 8 weeks)   1). Establish HEP 1). (I) HEP   2). imporive cervical position 35% 2). improve cervical position  75%   3). cervical strength to 4- 3). cervical strength to 4+

## 2017-12-12 ENCOUNTER — HOSPITAL ENCOUNTER (OUTPATIENT)
Dept: PHYSICAL THERAPY | Age: 52
Discharge: HOME | End: 2017-12-12
Admitting: ANESTHESIOLOGY

## 2017-12-12 NOTE — FLOWSHEET NOTE
Outpatient Physical Therapy     [x] Daily Treatment Note   [] Progress Note -   , 17 . 17,  10/6/17, 11/3/17  , 17 Bottom of this page. [] Discharge Note      Date:  2017        Patient Name:  Samson Crook        :  1965    Restrictions/Precautions:      Diagnosis:  Complex pain syndrome. Treatment Diagnosis:  Same, RSD, decreased ability to walk  Insurance/Certification information:  [facility] has approval for 8 weeks- 16 visits                                                          Referring Physician:  Dr. Mary Larson of care signed (Y/N):      Visit# / total visits:      10/24        Authorized from:    10/17/17-            Pain level: 5/10 neck when lying down      5/10 neck, with walking, ADLs       6-7/10  Constant left arm(entire  Arm)    Subjective: 17 fatigued today- did not sleep well. Her left arm pain is high today- burning pain. 17 pt is very pleased today. She used the [employer] bus for transportation- pleased to be more independent. 17 reports she is doing OK. Pt is frustrated with her home situation. 17 doing OK  17 pleased today that she did her own hair  17 doing OK  17 pain level is about the same. 17 doing OK  17 doing OK today, her left arm is a little less painful than on 11/3. Pain scale 6/10  17 doing well today. 11/3/17 pt returns to therapy today. Has approval for 24 visit s. Pain level is high today. 10/6/17 reports that she is more sore all over- perhaps due to the poor weather moving in.   10/3/17 her mouth is much better. She has 1 more approved visit after today- will wait on prog note til that day. 17 her mouth is still very sore but slowly improving  17 states she fell walking in the grass yesterday but was able to get up herself. Has had upper teeth extracted- very sore mouth  6/10 pain scale. 17 doing OK . About the sa ME.   Going to the dentis today,having her teeth pulled. Thirsty and hungry from no food or water since midnight. 9/11/17 feeling better. No c/o sharp neck pain. 9/5/17 still having some sharp pain from her left shoulder up her neck but not daily. She has been to the dentist. (pleased)  9/1/17 having nichole[p pain from her left shoulder up to her neck yesterday. No reason. Severe stormy weekend ahead- weather? Have also been doing more cervical strengthening for posture and gait. Pain   Patient describes pain to be in her left arm, upper back and neck. Patient reports Pain scale 4-6/10  . She has an implanted TENS unit for pain. Worsened by - stays the same most of the time  Improved by - lying on the couch     Social History/Environment:   Patient lives with .her sister  . . in a 1 story house with 1 step in the garage    Prior Level of Function:    has been very limited for the past 5 years. Has help with bathing. She is able to dress herself. She is able to walk about 20 ft with a walker. Sits in a recliner. Uses a walker to get to the bathroom.      Functional Complaints/ Current Level of Function:   Patient unable to .walk very far, states she has poor balance.      Patient goal for therapy:        11/3/17Be able to hold my head up more and walk better and not turn my foot in as much        Exercises:   Exercise/Equipment Resistance/Repetitions Other comments    12/19/16 cerv rot left  x5      Head,sh, elbow presses Hold 5 sec  X 5      Ankle- DF/PF  INV/EV 2x10     12/21/16 bridges           SLR       Dorsi flx       Heel raise     12/28/16 seated- core work- upper andlower trunk, cue for head position constantly , sit to stand, unlock the left knee, shift to the left 30 min      Manual- see below 15 min      gait   15 min  Attempted st cane- not able due to fear of falling Will work in llbars   Left foot- seated active eversion    2x10 and against resistance.     1/2/17-1/6/17 gait-llbars    30 min Foot placement, Therapeutic Activity:      Neuromuscular Re-education:        e.  3/29/17 Mir balance test performed. Score= 47 ( improved 3 points). 41-50 range is low risk of falling. 21-40 is moderate risk. Pt scored 16/28 on the second page of skills (higher level)    Gait:    45min   Working on posture, head position,  and foot postiion. Side to side, circles, forw and backwd,without AD.     amaabulating in sock feet- performs HS with her left but does not get to FF in order to push off her toe, instead her toes flex and her foot is drawn into inversion. Will work more in her sock feet. Fast walking in ll bars, mechnaics of jumping in ll bars. Walking without cane- imporved head and pelvis position. Used eversion dynamic strap to help inversion position. 5/26/17 15 min working on asymmetry of pelvis and shoudlers- her left hip is retracted with her upper trunk and shoulders rotated to the right with cervical rot to the R.      Manual Therapy:     11/14/17-12/8/17     15min costotransv jt mobiliz bilat in supine with pistol tech,cervical distraction, stretcing, mobiliz for rot, M-F release for left trunk and axilla.,,         5/26/17  30 min manual cerv distraction, stretching, PROM, 1st rib, thoracic stretch,  sttretcing and M-F release with sidelying over the red therapy ball. .        Towel roll stretch x 3 horiz and 1 verticle       Modalities:      Timed Code Treatment Minutes:     60           Total Treatment Minutes:     75         Treatment/Activity Tolerance:    [x] Patient tolerated treatment well [] Patient limited by fatigue   [x] Patient limited by pain- left shoulder and elbow/forearm.  [x] Patient limited by other medical complications-RSD  [] Other:     Prognosis: [x] Good [] Fair  [] Poor    Patient Requires Follow-up:  [x] Yes  [] No    Plan: [x] Continue per plan of care -    [] Alter current plan (see comments)   [] Plan of care initiated [] Hold pending MD visit [] Discharge          See Progress Note: [] Yes  [x] No       Next due:     17     Electronically signed by:  Mary Beth Villanueva, PT [id number]      Outpatient Physical Therapy  Progress Note    Date:  2017    Patient Name:  Radha Lima      :  1965    Restrictions/Precautions:      Diagnosis:  Complex pain syndrome    Treatment Diagnosis:  Same, decreased ability to walk, poor balance, weakness    Insurance/Certification information: [facility]   24 visits approved       Referring Physician:  Dr. Tomlin Shows of care signed (Y/N):      Visit# / total visits:           Pain level:  5 /10 left arm. Progress Note covers period from:   11/3/17 To date on this note. Subjective:   17 reports she is improving with her balance at home. From 11/3/17   she is ambulating without her cane indoors. She is able to walk inside stores with her cane for short trips. She is going to try to use the grocery cart at the store instead of the w/c or electric cart.  (shopping cart at [employer], electric cart at [employer])  States she would like to have better posture with her head( constant side flexed and rot R)  She would like to be more independent with her own grooming (fixing her hair) and medications. Objective:  17 improving tone of the left side. Posture is more easily achieved. Inversion of the left foot is still problematic  11/3/17 improving head position and left foot position but her abnormal tone in her left foot overrides her ability to control it. I believe she will benefit from an AFO to make her more stable and independent. appled tape to her cervical spine today to offer a cue for imporved head palcment- appeared beneficial.           17 improving head osition and left foot position- still requires many cues- verble and tactile for good walking posture, othewise returns to left foot inversion and cerv rot R/SB. 11/3/17 murphy balance test: 50/56. Low risk of falls.   Scores low on tandem stand and single leg stand,  Has poor head position with walking (rot R and SB R), inversion of her left foot both due to the RSD. Minimal to no use of her left arm. Assessment:  G Code:  CJ  Summary:  .   Seen x 7 visits over the past month. Good progress. ,  Patient's response to treatment: good. Works extremely hard,  She is pleased with her progress. .. She would like to eventually return to her own home in the near future. Goals:  G code :  CJ  Short Term Goals ( 4 weeks) Long Term Goals ( 8 weeks)   1). Establish HEP 1). (I) HEP   2). imporive cervical position 35% 2). improve cervical position  75%   3). cervical strength to 4- 3). cervical strength to 4+   4). tandem stand left 30 sec  Right 20 sec. Single stnd 5 sec 4). good balance. Mir score to 53/56   5). Ambulate for 10 min continuously 5). ambulate continuously for 20 min   6). Fit with AFO 6). increase activiy  75%         Progress toward previous goals:    STG 1,2, 4 (partially)5 met. Will get AFO in next 2 weeks. LTG  Not met. ·   ·   · Plan:    Continue 2x wk for 24 total  visits.     Electronically Signed by: MARY ALICE [id number]

## 2017-12-15 ENCOUNTER — HOSPITAL ENCOUNTER (OUTPATIENT)
Dept: PHYSICAL THERAPY | Age: 52
Discharge: HOME | End: 2017-12-15
Admitting: ANESTHESIOLOGY

## 2017-12-15 NOTE — FLOWSHEET NOTE
resistance. 1/2/17-1/6/17 gait-llbars    30 min Foot placement, posture, head position, wt shift Feet apart, left foot out.     1/10/17-1/13/17 gait in ll bars 15 min          SLR, abd, heel raise 3x5 work on midline with head and left toe position. scap retraction, depression Sets of 5 x 5      Side step ups-left 2x10      1/24/17 sidelying left cerv side lift, abd of hip, and eversion of left ankle All 3x 5     3/31/17-4/14/17 forward and lateral step ups to the left   x30 with cues for wt shift, able to step forward without hands x 5 reps. Working on shifting wt left   Standing side leg raise and backwd rsies 3x5    4/5/17 bridges, bicycle legs, single leg bridge 2x10 each, given HO for HEP    4/19/17 outdoor walking on concrete, gravel,grass 300 ft x 2. SB asst, no cane. Only used cane on the gravel and grass (20 ft) A few stops to get centered and reposition head   4/28/17 combined step ups- 4-6 8 inch forward and step up15 min  and overs without UE support, doing saumya pad steps Working on wt shift to the left. Seated on therapy ball-wt shift, knee lift, low kick, arm raise, trunk co-contraction x10-20 reps each    Ambulating on the sidewalk 200 ft            Left foot eversion with red 3x10    5/5/17 HEP: turn circles in 4 sec, single stand, tandem stand 2x day   gvien HO     11/3/17-12/12/17 step ups- no hands6 in  3 in a row, many times Improved wt shifts     Turn her left foot into eversion Sets of 10 x 3    sidelying -lateral neck lifts 3x10    Prone cerv ext 3x10    Prone leg ext, then with R arm raise 3x10    Prone plank NV     . Prone knee flx to stretch ant thigh Hold 10 sec  5-7 each leg. Left more limited. Prone 4 pt   Elbows ad knees 2x10     torso stretching/ M-F release with 2 persons     Prone thoracic/cervical mobiliz costotransv jts, rot.     Gait ex- step ups, rockerboard, stepping over small cone/wt, 45 min                                      Therapeutic Exercise: 15 min  ,    Stretching and extremity ex. Fit with soft cervical collar for passive stretch on the R cervical spine and feeback/cues to pull away from the brace for cervical strengthening. Group Therapy:      Home Exercise Program:       Therapeutic Activity:      Neuromuscular Re-education:        e.  3/29/17 Mir balance test performed. Score= 47 ( improved 3 points). 41-50 range is low risk of falling. 21-40 is moderate risk. Pt scored 16/28 on the second page of skills (higher level)    Gait:     15min   Working on posture, head position,  and foot postiion. Side to side, circles, forw and backwd,without AD.     amaabulating in sock feet- performs HS with her left but does not get to FF in order to push off her toe, instead her toes flex and her foot is drawn into inversion. Will work more in her sock feet. Fast walking in ll bars, mechnaics of jumping in ll bars. Walking without cane- imporved head and pelvis position. Used eversion dynamic strap to help inversion position. 5/26/17 15 min working on asymmetry of pelvis and shoudlers- her left hip is retracted with her upper trunk and shoulders rotated to the right with cervical rot to the R.      Manual Therapy:     11/14/17-12/15/17      30min costotransv jt mobiliz bilat in supine with pistol tech,cervical distraction, stretcing, mobiliz for rot, M-F release for left trunk and axilla.,,         5/26/17  30 min manual cerv distraction, stretching, PROM, 1st rib, thoracic stretch,  sttretcing and M-F release with sidelying over the red therapy ball. .        Towel roll stretch x 3 horiz and 1 verticle       Modalities:      Timed Code Treatment Minutes:     60           Total Treatment Minutes:     75         Treatment/Activity Tolerance:    [x] Patient tolerated treatment well [] Patient limited by fatigue   [x] Patient limited by pain- left shoulder and elbow/forearm.  [x] Patient limited by other medical complications-RSD  [] Other: Prognosis: [x] Good [] Fair  [] Poor    Patient Requires Follow-up:  [x] Yes  [] No    Plan: [x] Continue per plan of care -    [] Alter current plan (see comments)   [] Plan of care initiated [] Hold pending MD visit [] Discharge          See Progress Note: [] Yes  [x] No       Next due:     17     Electronically signed by:  Salima Vega, PT [id number]      Outpatient Physical Therapy  Progress Note    Date:  12/15/2017    Patient Name:  Citlali Nascimento      :  1965    Restrictions/Precautions:      Diagnosis:  Complex pain syndrome    Treatment Diagnosis:  Same, decreased ability to walk, poor balance, weakness    Insurance/Certification information: [facility]   24 visits approved       Referring Physician:  Dr. Albert Waller of care signed (Y/N):      Visit# / total visits:           Pain level:  5 /10 left arm. Progress Note covers period from:   11/3/17 To date on this note. Subjective:   17 reports she is improving with her balance at home. From 11/3/17   she is ambulating without her cane indoors. She is able to walk inside stores with her cane for short trips. She is going to try to use the grocery cart at the store instead of the w/c or electric cart.  (shopping cart at [employer], electric cart at [employer])  States she would like to have better posture with her head( constant side flexed and rot R)  She would like to be more independent with her own grooming (fixing her hair) and medications. Objective:  17 improving tone of the left side. Posture is more easily achieved. Inversion of the left foot is still problematic  11/3/17 improving head position and left foot position but her abnormal tone in her left foot overrides her ability to control it. I believe she will benefit from an AFO to make her more stable and independent.   appled tape to her cervical spine today to offer a cue for imporved head palcment- appeared beneficial.           17

## 2017-12-26 ENCOUNTER — HOSPITAL ENCOUNTER (OUTPATIENT)
Dept: PHYSICAL THERAPY | Age: 52
Discharge: HOME | End: 2017-12-26
Admitting: ANESTHESIOLOGY

## 2017-12-26 NOTE — FLOWSHEET NOTE
Outpatient Physical Therapy     [x] Daily Treatment Note   [] Progress Note -   , 17 . 17,  10/6/17, 11/3/17  , 17 Bottom of this page. [] Discharge Note      Date:  2017        Patient Name:  James Dhaliwal        :  1965    Restrictions/Precautions:      Diagnosis:  Complex pain syndrome. Treatment Diagnosis:  Same, RSD, decreased ability to walk  Insurance/Certification information:  [facility] has approval for 8 weeks- 16 visits                                                          Referring Physician:  Dr. Prince Figueredo of care signed (Y/N):      Visit# / total visits:              Authorized from:    10/17/17-            Pain level: 5/10 neck when lying down      5/10 neck, with walking, ADLs       -7/10  Constant left arm(entire  Arm)    Subjective: 17 th eleft arm is more painful today- it is very cold outside and sometimes this bothers her more  12/15/17 doing better with her left arm pain today. Back to 5/10  12/12/17 fatigued today- did not sleep well. Her left arm pain is high today- burning pain. 17 pt is very pleased today. She used the [employer] for transportation- pleased to be more independent. 17 reports she is doing OK. Pt is frustrated with her home situation. 17 doing OK  17 pleased today that she did her own hair  17 doing OK  17 pain level is about the same. 17 doing OK  17 doing OK today, her left arm is a little less painful than on 11/3. Pain scale 6/10  11/7/17 doing well today. 11/3/17 pt returns to therapy today. Has approval for 24 visit s. Pain level is high today. 10/6/17 reports that she is more sore all over- perhaps due to the poor weather moving in.   10/3/17 her mouth is much better. She has 1 more approved visit after today- will wait on prog note til that day.   17 her mouth is still very sore but slowly improving  17 states she fell walking in the grass yesterday but was able to get up herself. Has had upper teeth extracted- very sore mouth  6/10 pain scale. 9/19/17 doing OK . About the sa ME. Going to the dentis today,having her teeth pulled. Thirsty and hungry from no food or water since midnight. 9/11/17 feeling better. No c/o sharp neck pain. 9/5/17 still having some sharp pain from her left shoulder up her neck but not daily. She has been to the dentist. (pleased)  9/1/17 having nichole[p pain from her left shoulder up to her neck yesterday. No reason. Severe stormy weekend ahead- weather? Have also been doing more cervical strengthening for posture and gait. Pain   Patient describes pain to be in her left arm, upper back and neck. Patient reports Pain scale 4-6/10  . She has an implanted TENS unit for pain. Worsened by - stays the same most of the time  Improved by - lying on the couch     Social History/Environment:   Patient lives with .her sister  . . in a 1 story house with 1 step in the garage    Prior Level of Function:    has been very limited for the past 5 years. Has help with bathing. She is able to dress herself. She is able to walk about 20 ft with a walker. Sits in a recliner.  Uses a walker to get to the bathroom.      Functional Complaints/ Current Level of Function:   Patient unable to .walk very far, states she has poor balance.      Patient goal for therapy:        11/3/17Be able to hold my head up more and walk better and not turn my foot in as much        Exercises:   Exercise/Equipment Resistance/Repetitions Other comments    12/19/16 cerv rot left  x5      Head,sh, elbow presses Hold 5 sec  X 5      Ankle- DF/PF  INV/EV 2x10     12/21/16 bridges           SLR       Dorsi flx       Heel raise     12/28/16 seated- core work- upper andlower trunk, cue for head position constantly , sit to stand, unlock the left knee, shift to the left 30 min      Manual- see below 15 min      gait   15 min  Attempted st cane- not able due to fear of falling Will work in llbars   Left foot- seated active eversion    2x10 and against resistance. 1/2/17-1/6/17 gait-llbars    30 min Foot placement, posture, head position, wt shift Feet apart, left foot out.     1/10/17-1/13/17 gait in ll bars 15 min          SLR, abd, heel raise 3x5 work on midline with head and left toe position. scap retraction, depression Sets of 5 x 5      Side step ups-left 2x10      1/24/17 sidelying left cerv side lift, abd of hip, and eversion of left ankle All 3x 5     3/31/17-4/14/17 forward and lateral step ups to the left   x30 with cues for wt shift, able to step forward without hands x 5 reps. Working on shifting wt left   Standing side leg raise and backwd rsies 3x5    4/5/17 bridges, bicycle legs, single leg bridge 2x10 each, given HO for HEP    4/19/17 outdoor walking on concrete, gravel,grass 300 ft x 2. SB asst, no cane. Only used cane on the gravel and grass (20 ft) A few stops to get centered and reposition head   4/28/17 combined step ups- 4-6 8 inch forward and step up15 min  and overs without UE support, doing saumya pad steps Working on wt shift to the left. Seated on therapy ball-wt shift, knee lift, low kick, arm raise, trunk co-contraction x10-20 reps each    Ambulating on the sidewalk 200 ft            Left foot eversion with red 3x10    5/5/17 HEP: turn circles in 4 sec, single stand, tandem stand 2x day   gvien HO     11/3/17-12/26/17 step ups- no hands6 in  3 in a row, many times Improved wt shifts     Turn her left foot into eversion Sets of 10 x 3    sidelying -lateral neck lifts 3x10    Prone cerv ext 3x10    Prone leg ext, then with R arm raise 3x10    Prone plank NV     . Prone knee flx to stretch ant thigh Hold 10 sec  5-7 each leg. Left more limited. Prone 4 pt   Elbows ad knees 2x10     torso stretching/ M-F release with 2 persons     Prone thoracic/cervical mobiliz costotransv jts, rot.     Gait ex- step ups, rockerboard, stepping over small cone/wt,   10 min                                      Therapeutic Exercise:   15 min  ,    Stretching and extremity ex. Fit with soft cervical collar for passive stretch on the R cervical spine and feeback/cues to pull away from the brace for cervical strengthening. Group Therapy:      Home Exercise Program:       Therapeutic Activity:      Neuromuscular Re-education:        e.  3/29/17 Mir balance test performed. Score= 47 ( improved 3 points). 41-50 range is low risk of falling. 21-40 is moderate risk. Pt scored 16/28 on the second page of skills (higher level)    Gait:     15min   Working on posture, head position,  and foot postiion. Side to side, circles, forw and backwd,without AD.     amaabulating in sock feet- performs HS with her left but does not get to FF in order to push off her toe, instead her toes flex and her foot is drawn into inversion. Will work more in her sock feet. Fast walking in ll bars, mechnaics of jumping in ll bars. Walking without cane- imporved head and pelvis position. Used eversion dynamic strap to help inversion position. 5/26/17 15 min working on asymmetry of pelvis and shoudlers- her left hip is retracted with her upper trunk and shoulders rotated to the right with cervical rot to the R.      Manual Therapy:     11/14/17-12/15/17      30min costotransv jt mobiliz bilat in supine with pistol tech,cervical distraction, stretcing, mobiliz for rot, M-F release for left trunk and axilla.,,         5/26/17  30 min manual cerv distraction, stretching, PROM, 1st rib, thoracic stretch,  sttretcing and M-F release with sidelying over the red therapy ball.    .        Towel roll stretch x 3 horiz and 1 verticle       Modalities:      Timed Code Treatment Minutes:       60        Total Treatment Minutes:     75         Treatment/Activity Tolerance:    [x] Patient tolerated treatment well [] Patient limited by fatigue   [] Patient limited by pain-

## 2017-12-29 ENCOUNTER — HOSPITAL ENCOUNTER (OUTPATIENT)
Dept: PHYSICAL THERAPY | Age: 52
Discharge: HOME | End: 2017-12-29
Admitting: ANESTHESIOLOGY

## 2017-12-29 NOTE — FLOWSHEET NOTE
fatigue   [] Patient limited by pain- left shoulder and elbow/forearm. [x] Patient limited by other medical complications-RSD  [] Other:     Prognosis: [x] Good [] Fair  [] Poor    Patient Requires Follow-up:  [x] Yes  [] No    Plan: [x] Continue per plan of care -    [] Alter current plan (see comments)   [] Plan of care initiated [] Hold pending MD visit [] Discharge          See Progress Note: [] Yes  [x] No       Next due:     17     Electronically signed by:  Whit Ceja, PT [id number]      Outpatient Physical Therapy  Progress Note    Date:  2017    Patient Name:  Karla Mims      :  1965    Restrictions/Precautions:      Diagnosis:  Complex pain syndrome    Treatment Diagnosis:  Same, decreased ability to walk, poor balance, weakness    Insurance/Certification information: [facility]   24 visits approved       Referring Physician:  Dr. Harris Dent of care signed (Y/N):      Visit# / total visits:           Pain level:  5 /10 left arm. Progress Note covers period from:   11/3/17 To date on this note. Subjective:   17 reports she is improving with her balance at home. From 11/3/17   she is ambulating without her cane indoors. She is able to walk inside stores with her cane for short trips. She is going to try to use the grocery cart at the store instead of the w/c or electric cart.  (shopping cart at [employer], electric cart at [employer])  States she would like to have better posture with her head( constant side flexed and rot R)  She would like to be more independent with her own grooming (fixing her hair) and medications. Objective:  17 improving tone of the left side. Posture is more easily achieved. Inversion of the left foot is still problematic  11/3/17 improving head position and left foot position but her abnormal tone in her left foot overrides her ability to control it.   I believe she will benefit from an AFO to make her more stable and

## 2018-01-01 ENCOUNTER — HOSPITAL ENCOUNTER (OUTPATIENT)
Dept: OTHER | Age: 53
Discharge: HOME | End: 2018-01-31
Attending: ANESTHESIOLOGY | Admitting: ANESTHESIOLOGY

## 2018-01-02 ENCOUNTER — HOSPITAL ENCOUNTER (OUTPATIENT)
Dept: PHYSICAL THERAPY | Age: 53
Discharge: HOME | End: 2018-01-02
Admitting: ANESTHESIOLOGY

## 2018-01-02 NOTE — FLOWSHEET NOTE
shift to the left 30 min      Manual- see below 15 min      gait   15 min  Attempted st cane- not able due to fear of falling Will work in llbars   Left foot- seated active eversion    2x10 and against resistance. 1/2/17-1/6/17 gait-llbars    30 min Foot placement, posture, head position, wt shift Feet apart, left foot out.     1/10/17-1/13/17 gait in ll bars 15 min          SLR, abd, heel raise 3x5 work on midline with head and left toe position. scap retraction, depression Sets of 5 x 5      Side step ups-left 2x10      1/24/17 sidelying left cerv side lift, abd of hip, and eversion of left ankle All 3x 5     3/31/17-4/14/17 forward and lateral step ups to the left   x30 with cues for wt shift, able to step forward without hands x 5 reps. Working on shifting wt left   Standing side leg raise and backwd rsies 3x5    4/5/17 bridges, bicycle legs, single leg bridge 2x10 each, given HO for HEP    4/19/17 outdoor walking on concrete, gravel,grass 300 ft x 2. SB asst, no cane. Only used cane on the gravel and grass (20 ft) A few stops to get centered and reposition head   4/28/17 combined step ups- 4-6 8 inch forward and step up15 min  and overs without UE support, doing saumya pad steps Working on wt shift to the left. Seated on therapy ball-wt shift, knee lift, low kick, arm raise, trunk co-contraction x10-20 reps each    Ambulating on the sidewalk 200 ft            Left foot eversion with red 3x10    5/5/17 HEP: turn circles in 4 sec, single stand, tandem stand 2x day   gvien HO     11/3/17-1/2/18 step ups- no hands6 in  3 in a row, many times Improved wt shifts     Turn her left foot into eversion Sets of 10 x 3    sidelying -lateral neck lifts 3x10    Prone cerv ext 3x10    Prone leg ext, then with R arm raise 3x10    Prone plank NV     . Prone knee flx to stretch ant thigh Hold 10 sec  5-7 each leg. Left more limited.     Prone 4 pt   Elbows ad knees 2x10     torso stretching/ M-F release with 2 persons     Prone thoracic/cervical mobiliz costotransv jts, rot. Gait ex- step ups, rockerboard, stepping over small cone/wt, bosu    15 min                                      Therapeutic Exercise:   15 min  ,    Stretching and extremity ex. Fit with soft cervical collar for passive stretch on the R cervical spine and feeback/cues to pull away from the brace for cervical strengthening. Group Therapy:      Home Exercise Program:       Therapeutic Activity:      Neuromuscular Re-education:        e.  3/29/17 Mir balance test performed. Score= 47 ( improved 3 points). 41-50 range is low risk of falling. 21-40 is moderate risk. Pt scored 16/28 on the second page of skills (higher level)    Gait:     15min   Working on posture, head position,  and foot postiion. Side to side, circles, forw and backwd,without AD.     amaabulating in sock feet- performs HS with her left but does not get to FF in order to push off her toe, instead her toes flex and her foot is drawn into inversion. Will work more in her sock feet. Fast walking in ll bars, mechnaics of jumping in ll bars. Walking without cane- imporved head and pelvis position. Used eversion dynamic strap to help inversion position. 5/26/17 15 min working on asymmetry of pelvis and shoudlers- her left hip is retracted with her upper trunk and shoulders rotated to the right with cervical rot to the R.      Manual Therapy:     11/14/17-1/2/18      30min costotransv jt mobiliz bilat in supine with pistol tech,cervical distraction, stretcing, mobiliz for rot, M-F release for left trunk and axilla.,,         5/26/17  30 min manual cerv distraction, stretching, PROM, 1st rib, thoracic stretch,  sttretcing and M-F release with sidelying over the red therapy ball.    .        Towel roll stretch x 3 horiz and 1 verticle       Modalities:      Timed Code Treatment Minutes:       60      Total Treatment Minutes:      70         Treatment/Activity believe she will benefit from an AFO to make her more stable and independent. appled tape to her cervical spine today to offer a cue for imporved head palcment- appeared beneficial.           9/1/17 improving head osition and left foot position- still requires many cues- verble and tactile for good walking posture, othewise returns to left foot inversion and cerv rot R/SB. 11/3/17 murphy balance test: 50/56. Low risk of falls. Scores low on tandem stand and single leg stand,  Has poor head position with walking (rot R and SB R), inversion of her left foot both due to the RSD. Minimal to no use of her left arm. Assessment:  G Code:  CJ  Summary:  .   Seen x 7 visits over the past month. Good progress. ,  Patient's response to treatment: good. Works extremely hard,  She is pleased with her progress. .. She would like to eventually return to her own home in the near future. Goals:  G code :  CJ  Short Term Goals ( 4 weeks) Long Term Goals ( 8 weeks)   1). Establish HEP 1). (I) HEP   2). imporive cervical position 35% 2). improve cervical position  75%   3). cervical strength to 4- 3). cervical strength to 4+   4). tandem stand left 30 sec  Right 20 sec. Single stnd 5 sec 4). good balance. Murphy score to 53/56   5). Ambulate for 10 min continuously 5). ambulate continuously for 20 min   6). Fit with AFO 6). increase activiy  75%         Progress toward previous goals:    STG 1,2, 4 (partially)5 met. Will get AFO in next 2 weeks. LTG  Not met. ·   ·   · Plan:    Continue 2x wk for 24 total  visits.     Electronically Signed by: Bernard Huang, [id number]

## 2018-01-05 ENCOUNTER — HOSPITAL ENCOUNTER (OUTPATIENT)
Dept: PHYSICAL THERAPY | Age: 53
Discharge: HOME | End: 2018-01-05
Admitting: ANESTHESIOLOGY

## 2018-01-05 NOTE — FLOWSHEET NOTE
wait on prog note til that day. 9/25/17 her mouth is still very sore but slowly improving  9/22/17 states she fell walking in the grass yesterday but was able to get up herself. Has had upper teeth extracted- very sore mouth  6/10 pain scale. 9/19/17 doing OK . About the sa ME. Going to the dentis today,having her teeth pulled. Thirsty and hungry from no food or water since midnight. 9/11/17 feeling better. No c/o sharp neck pain. 9/5/17 still having some sharp pain from her left shoulder up her neck but not daily. She has been to the dentist. (pleased)  9/1/17 having nichole[p pain from her left shoulder up to her neck yesterday. No reason. Severe stormy weekend ahead- weather? Have also been doing more cervical strengthening for posture and gait. Pain   Patient describes pain to be in her left arm, upper back and neck. Patient reports Pain scale 4-6/10  . She has an implanted TENS unit for pain. Worsened by - stays the same most of the time  Improved by - lying on the couch     Social History/Environment:   Patient lives with .her sister  . . in a 1 story house with 1 step in the garage    Prior Level of Function:    has been very limited for the past 5 years. Has help with bathing. She is able to dress herself. She is able to walk about 20 ft with a walker. Sits in a recliner.  Uses a walker to get to the bathroom.      Functional Complaints/ Current Level of Function:   Patient unable to .walk very far, states she has poor balance.      Patient goal for therapy:        11/3/17Be able to hold my head up more and walk better and not turn my foot in as much        Exercises:   Exercise/Equipment Resistance/Repetitions Other comments    12/19/16 cerv rot left  x5      Head,sh, elbow presses Hold 5 sec  X 5      Ankle- DF/PF  INV/EV 2x10     12/21/16 bridges           SLR       Dorsi flx       Heel raise     12/28/16 seated- core work- upper andlower trunk, cue for head position constantly , sit to

## 2018-01-12 ENCOUNTER — HOSPITAL ENCOUNTER (OUTPATIENT)
Dept: PHYSICAL THERAPY | Age: 53
Discharge: HOME | End: 2018-01-12
Admitting: ANESTHESIOLOGY

## 2018-01-12 NOTE — FLOWSHEET NOTE
12/28/16 seated- core work- upper andlower trunk, cue for head position constantly , sit to stand, unlock the left knee, shift to the left 30 min      Manual- see below 15 min      gait   15 min  Attempted st cane- not able due to fear of falling Will work in llbars   Left foot- seated active eversion    2x10 and against resistance. 1/2/17-1/6/17 gait-llbars    30 min Foot placement, posture, head position, wt shift Feet apart, left foot out.     1/10/17-1/13/17 gait in ll bars 15 min          SLR, abd, heel raise 3x5 work on midline with head and left toe position. scap retraction, depression Sets of 5 x 5      Side step ups-left 2x10      1/24/17 sidelying left cerv side lift, abd of hip, and eversion of left ankle All 3x 5     3/31/17-4/14/17 forward and lateral step ups to the left   x30 with cues for wt shift, able to step forward without hands x 5 reps. Working on shifting wt left   Standing side leg raise and backwd rsies 3x5    4/5/17 bridges, bicycle legs, single leg bridge 2x10 each, given HO for HEP    4/19/17 outdoor walking on concrete, gravel,grass 300 ft x 2. SB asst, no cane. Only used cane on the gravel and grass (20 ft) A few stops to get centered and reposition head   4/28/17 combined step ups- 4-6 8 inch forward and step up15 min  and overs without UE support, doing saumya pad steps Working on wt shift to the left. Seated on therapy ball-wt shift, knee lift, low kick, arm raise, trunk co-contraction x10-20 reps each    Ambulating on the sidewalk 200 ft            Left foot eversion with red 3x10    5/5/17 HEP: turn circles in 4 sec, single stand, tandem stand 2x day   gvien HO     11/3/17-1/2/18 step ups- no hands6 in  3 in a row, many times Improved wt shifts     Turn her left foot into eversion Sets of 10 x 3    sidelying -lateral neck lifts 3x10    Prone cerv ext 3x10    Prone leg ext, then with R arm raise 3x10    Prone plank NV     .      Prone knee flx to stretch ant thigh Hold 10 sec  5-7 each leg. Left more limited. Prone 4 pt   Elbows ad knees 2x10     torso stretching/ M-F release with 2 persons     Prone thoracic/cervical mobiliz costotransv jts, rot. Gait ex- step ups, rockerboard, stepping over small cone/wt, bosu    15 min    1/12/18 gait- wt shifting ,  walking 15 min    neuromusc-balance skills 15 min                            Therapeutic Exercise:     min  ,    Stretching and extremity ex. Fit with soft cervical collar for passive stretch on the R cervical spine and feeback/cues to pull away from the brace for cervical strengthening. Group Therapy:      Home Exercise Program:       Therapeutic Activity:      Neuromuscular Re-education:        e.  3/29/17 Mir balance test performed. Score= 47 ( improved 3 points). 41-50 range is low risk of falling. 21-40 is moderate risk. Pt scored 16/28 on the second page of skills (higher level)    Gait:   15 min    AFO on the left to help control adduction of her foot. Working on posture, head position,  and foot postiion. Side to side, circles, forw and backwd,without AD.     amaabulating in sock feet- performs HS with her left but does not get to FF in order to push off her toe, instead her toes flex and her foot is drawn into inversion. Will work more in her sock feet. Fast walking in ll bars, mechnaics of jumping in ll bars. Walking without cane- imporved head and pelvis position. Used eversion dynamic strap to help inversion position.     5/26/17 15 min working on asymmetry of pelvis and shoudlers- her left hip is retracted with her upper trunk and shoulders rotated to the right with cervical rot to the R.      Manual Therapy:     11/14/17-1/12/18      30min costotransv jt mobiliz bilat in supine with pistol tech,cervical distraction, stretcing, mobiliz for rot, M-F release for left trunk and axilla.,,         5/26/17  30 min manual cerv distraction, stretching, PROM, 1st rib, thoracic stretch, improving tone of the left side. Posture is more easily achieved. Inversion of the left foot is still problematic  11/3/17 improving head position and left foot position but her abnormal tone in her left foot overrides her ability to control it. I believe she will benefit from an AFO to make her more stable and independent. appled tape to her cervical spine today to offer a cue for imporved head palcment- appeared beneficial.           9/1/17 improving head osition and left foot position- still requires many cues- verble and tactile for good walking posture, othewise returns to left foot inversion and cerv rot R/SB. 11/3/17 murphy balance test: 50/56. Low risk of falls. Scores low on tandem stand and single leg stand,  Has poor head position with walking (rot R and SB R), inversion of her left foot both due to the RSD. Minimal to no use of her left arm. Assessment:  G Code:  CJ  Summary:  .   Seen x 7 visits over the past month. Good progress. ,  Patient's response to treatment: good. Works extremely hard,  She is pleased with her progress. .. She would like to eventually return to her own home in the near future. Goals:  G code :  CJ  Short Term Goals ( 4 weeks) Long Term Goals ( 8 weeks)   1). Establish HEP 1). (I) HEP   2). imporive cervical position 35% 2). improve cervical position  75%   3). cervical strength to 4- 3). cervical strength to 4+   4). tandem stand left 30 sec  Right 20 sec. Single stnd 5 sec 4). good balance. Murphy score to 53/56   5). Ambulate for 10 min continuously 5). ambulate continuously for 20 min   6). Fit with AFO 6). increase activiy  75%         Progress toward previous goals:    STG 1,2, 4 (partially)5 met. Will get AFO in next 2 weeks. LTG  Not met. ·   ·   · Plan:    Continue 2x wk for 24 total  visits.     Electronically Signed by: Yaneth Reed, [id number]

## 2018-01-16 ENCOUNTER — HOSPITAL ENCOUNTER (OUTPATIENT)
Dept: PHYSICAL THERAPY | Age: 53
Discharge: HOME | End: 2018-01-16
Admitting: ANESTHESIOLOGY

## 2018-01-16 NOTE — FLOWSHEET NOTE
Outpatient Physical Therapy     [x] Daily Treatment Note   [x] Progress Note -   , 17 . 17,  10/6/17, 11/3/17  , 17.18  Bottom of this page. [] Discharge Note      Date:  2018        Patient Name:  Wilman Heck        :  1965    Restrictions/Precautions:      Diagnosis:  Complex pain syndrome. Treatment Diagnosis:  Same, RSD, decreased ability to walk  Insurance/Certification information:  [facility] has approval for 8 weeks- 16 visits                                                          Referring Physician:  Dr. Igor Lilly of care signed (Y/N):      Visit# / total visits:              Authorized from:    10/17/17-  18            Pain level: 5/10 neck when lying down      5/10 neck, with walking, ADLs       [phone]  Constant left arm(entire  Arm)    Subjective: 18 therapist running 12 min late. She is doing OK  18 doing OK. Has been using her AFO brace- using it on the outside of her pants. 18 dong about the same. 18 states she tripped in the kitchen, caught herself on the sink. 17 voice is strong today  17 th eleft arm is more painful today- it is very cold outside and sometimes this bothers her more  12/15/17 doing better with her left arm pain today. Back to 5/10  12/12/17 fatigued today- did not sleep well. Her left arm pain is high today- burning pain. 17 pt is very pleased today. She used the [employer] for transportation- pleased to be more independent. 17 reports she is doing OK. Pt is frustrated with her home situation. 17 doing OK  17 pleased today that she did her own hair  17 doing OK  17 pain level is about the same. 17 doing OK  17 doing OK today, her left arm is a little less painful than on 11/3. Pain scale 6/10  11/7/17 doing well today. 11/3/17 pt returns to therapy today. Has approval for 24 visit s. Pain level is high today.   10/6/17 reports that she is more sore all over- perhaps due to the poor weather moving in.   10/3/17 her mouth is much better. She has 1 more approved visit after today- will wait on prog note til that day. 9/25/17 her mouth is still very sore but slowly improving  9/22/17 states she fell walking in the grass yesterday but was able to get up herself. Has had upper teeth extracted- very sore mouth  6/10 pain scale. 9/19/17 doing OK . About the sa ME. Going to the dentis today,having her teeth pulled. Thirsty and hungry from no food or water since midnight. 9/11/17 feeling better. No c/o sharp neck pain. 9/5/17 still having some sharp pain from her left shoulder up her neck but not daily. She has been to the dentist. (pleased)  9/1/17 having nichole[p pain from her left shoulder up to her neck yesterday. No reason. Severe stormy weekend ahead- weather? Have also been doing more cervical strengthening for posture and gait. Pain   Patient describes pain to be in her left arm, upper back and neck. Patient reports Pain scale 4-6/10  . She has an implanted TENS unit for pain. Worsened by - stays the same most of the time  Improved by - lying on the couch     Social History/Environment:   Patient lives with .her sister  . . in a 1 story house with 1 step in the garage    Prior Level of Function:    has been very limited for the past 5 years. Has help with bathing. She is able to dress herself. She is able to walk about 20 ft with a walker. Sits in a recliner.  Uses a walker to get to the bathroom.      Functional Complaints/ Current Level of Function:   Patient unable to .walk very far, states she has poor balance.      Patient goal for therapy:        11/3/17Be able to hold my head up more and walk better and not turn my foot in as much        Exercises:   Exercise/Equipment Resistance/Repetitions Other comments    12/19/16 cerv rot left  x5      Head,sh, elbow presses Hold 5 sec  X 5      Ankle- DF/PF  INV/EV 2x10     12/21/16 bridges           SLR       Dorsi flx       Heel raise     12/28/16 seated- core work- upper andlower trunk, cue for head position constantly , sit to stand, unlock the left knee, shift to the left 30 min      Manual- see below 15 min      gait   15 min  Attempted st cane- not able due to fear of falling Will work in llbars   Left foot- seated active eversion    2x10 and against resistance. 1/2/17-1/6/17 gait-llbars    30 min Foot placement, posture, head position, wt shift Feet apart, left foot out.     1/10/17-1/13/17 gait in ll bars 15 min          SLR, abd, heel raise 3x5 work on midline with head and left toe position. scap retraction, depression Sets of 5 x 5      Side step ups-left 2x10      1/24/17 sidelying left cerv side lift, abd of hip, and eversion of left ankle All 3x 5     3/31/17-4/14/17 forward and lateral step ups to the left   x30 with cues for wt shift, able to step forward without hands x 5 reps. Working on shifting wt left   Standing side leg raise and backwd rsies 3x5    4/5/17 bridges, bicycle legs, single leg bridge 2x10 each, given HO for HEP    4/19/17 outdoor walking on concrete, gravel,grass 300 ft x 2. SB asst, no cane. Only used cane on the gravel and grass (20 ft) A few stops to get centered and reposition head   4/28/17 combined step ups- 4-6 8 inch forward and step up15 min  and overs without UE support, doing saumya pad steps Working on wt shift to the left.     Seated on therapy ball-wt shift, knee lift, low kick, arm raise, trunk co-contraction x10-20 reps each    Ambulating on the sidewalk 200 ft            Left foot eversion with red 3x10    5/5/17 HEP: turn circles in 4 sec, single stand, tandem stand 2x day   gvien HO     11/3/17-1/2/18 step ups- no hands6 in  3 in a row, many times Improved wt shifts     Turn her left foot into eversion Sets of 10 x 3    sidelying -lateral neck lifts 3x10    Prone cerv ext 3x10    Prone leg ext, then with R arm raise 3x10    Prone

## 2018-01-31 ENCOUNTER — HOSPITAL ENCOUNTER (OUTPATIENT)
Dept: PHYSICAL THERAPY | Age: 53
Discharge: HOME | End: 2018-02-01
Admitting: ANESTHESIOLOGY

## 2018-01-31 NOTE — PROGRESS NOTES
Therapy:     17-18      30min costotransv jt mobiliz bilat in supine with pistol tech,cervical distraction, stretcing, mobiliz for rot, M-F release for left trunk and axilla.,,         17  30 min manual cerv distraction, stretching, PROM, 1st rib, thoracic stretch,  sttretcing and M-F release with sidelying over the red therapy ball. .        Towel roll stretch x 3 horiz and 1 verticle       Modalities:      Timed Code Treatment Minutes:      60    Total Treatment Minutes:      60         Treatment/Activity Tolerance:    [x] Patient tolerated treatment well [] Patient limited by fatigue   [] Patient limited by pain- left shoulder and elbow/forearm. [x] Patient limited by other medical complications-RSD  [] Other:     Prognosis: [x] Good [] Fair  [] Poor    Patient Requires Follow-up:  [x] Yes  [] No    Plan: [] Continue per plan of care -    [] Alter current plan (see comments)   [] Plan of care initiated [x] Hold pending MD visit- needs new C9 [] Discharge          See Progress Note: [x] Yes  [] No       Next due:           Electronically signed by:  Yovany De Leon, PT [id number]      Outpatient Physical Therapy  Progress Note    Date:  2018    Patient Name:  Sara Snyder      :  1965    Restrictions/Precautions:      Diagnosis:  Complex pain syndrome    Treatment Diagnosis:  Same, decreased ability to walk, poor balance, weakness    Insurance/Certification information: [facility]   24 visits approved       Referring Physician:  Dr. Katherine Preciado of care signed (Y/N):      Visit# / total visits:            Will need a new C9 due to reaching the extended date of 18. Pain level:  5 /10 left arm. Progress Note covers period from:    18 To date on this note. Subjective:    reports she is improving with her balance at home. From 11/3/17   she is ambulating without her cane indoors.   Reports he pain level varies on her left side but has improved with strength to 4+   4). tandem stand left 30 sec  Right 20 sec. Single stnd 5 sec 4). good balance. Mir score to 53/56   5). Ambulate for 10 min continuously 5). ambulate continuously for 20 min   6). Fit with AFO 6). increase activiy  75%         Progress toward previous goals:    ST,2, 4 (partially)5, 6  met. LTG   Close to meeting 4.  ·   ·   · Plan:   I highly recommend that she continue with PT. She continues to make steady progress with her posture, balance, gait, strength and overall function.     Electronically Signed by: Cayetano Sin, [id number]

## 2018-02-01 ENCOUNTER — HOSPITAL ENCOUNTER (OUTPATIENT)
Dept: OTHER | Age: 53
Discharge: HOME | End: 2018-02-28
Attending: ANESTHESIOLOGY | Admitting: ANESTHESIOLOGY

## 2018-02-19 ENCOUNTER — HOSPITAL ENCOUNTER (OUTPATIENT)
Dept: PHYSICAL THERAPY | Age: 53
Discharge: HOME | End: 2018-02-20
Admitting: ANESTHESIOLOGY

## 2018-02-19 NOTE — FLOWSHEET NOTE
her left arm is a little less painful than on 11/3. Pain scale 6/10  11/7/17 doing well today. 11/3/17 pt returns to therapy today. Has approval for 24 visit s. Pain level is high today. 10/6/17 reports that she is more sore all over- perhaps due to the poor weather moving in.   10/3/17 her mouth is much better. She has 1 more approved visit after today- will wait on prog note til that day. 9/25/17 her mouth is still very sore but slowly improving  9/22/17 states she fell walking in the grass yesterday but was able to get up herself. Has had upper teeth extracted- very sore mouth  6/10 pain scale. 9/19/17 doing OK . About the sa ME. Going to the dentis today,having her teeth pulled. Thirsty and hungry from no food or water since midnight. 9/11/17 feeling better. No c/o sharp neck pain. 9/5/17 still having some sharp pain from her left shoulder up her neck but not daily. She has been to the dentist. (pleased)  9/1/17 having nichole[p pain from her left shoulder up to her neck yesterday. No reason. Severe stormy weekend ahead- weather? Have also been doing more cervical strengthening for posture and gait. Pain   Patient describes pain to be in her left arm, upper back and neck. Patient reports Pain scale 4-6/10  . She has an implanted TENS unit for pain. Worsened by - stays the same most of the time  Improved by - lying on the couch     Social History/Environment:   Patient lives with .her sister  . . in a 1 story house with 1 step in the garage    Prior Level of Function:    has been very limited for the past 5 years. Has help with bathing. She is able to dress herself. She is able to walk about 20 ft with a walker. Sits in a recliner.  Uses a walker to get to the bathroom.      Functional Complaints/ Current Level of Function:   Patient unable to .walk very far, states she has poor balance.      Patient goal for therapy:        11/3/17Be able to hold my head up more and walk better and not turn my foot in as much        Exercises:   Exercise/Equipment Resistance/Repetitions Other comments    12/19/16 cerv rot left  x5      Head,sh, elbow presses Hold 5 sec  X 5      Ankle- DF/PF  INV/EV 2x10     12/21/16 bridges           SLR       Dorsi flx       Heel raise     12/28/16 seated- core work- upper andlower trunk, cue for head position constantly , sit to stand, unlock the left knee, shift to the left 30 min      Manual- see below 15 min      gait   15 min  Attempted st cane- not able due to fear of falling Will work in llbars   Left foot- seated active eversion    2x10 and against resistance. 1/2/17-1/6/17 gait-llbars    30 min Foot placement, posture, head position, wt shift Feet apart, left foot out.     1/10/17-1/13/17 gait in ll bars 15 min          SLR, abd, heel raise 3x5 work on midline with head and left toe position. scap retraction, depression Sets of 5 x 5      Side step ups-left 2x10      1/24/17 sidelying left cerv side lift, abd of hip, and eversion of left ankle All 3x 5     3/31/17-4/14/17 forward and lateral step ups to the left   x30 with cues for wt shift, able to step forward without hands x 5 reps. Working on shifting wt left   Standing side leg raise and backwd rsies 3x5    4/5/17 bridges, bicycle legs, single leg bridge 2x10 each, given HO for HEP    4/19/17 outdoor walking on concrete, gravel,grass 300 ft x 2. SB asst, no cane. Only used cane on the gravel and grass (20 ft) A few stops to get centered and reposition head   4/28/17 combined step ups- 4-6 8 inch forward and step up15 min  and overs without UE support, doing saumya pad steps Working on wt shift to the left.     Seated on therapy ball-wt shift, knee lift, low kick, arm raise, trunk co-contraction x10-20 reps each    Ambulating on the sidewalk 200 ft            Left foot eversion with red 3x10    5/5/17 HEP: turn circles in 4 sec, single stand, tandem stand 2x day   gvien HO     11/3/17-1/2/18 step ups- no 4).tandem stand left 30 sec  Right 20 sec. Single stnd 5 sec 4). good balance. Mir score to 53/56   5). Ambulate for 10 min continuously 5). ambulate continuously for 20 min   6). Fit with AFO 6). increase activiy  75%         Progress toward previous goals:    ST,2, 4 (partially)5, 6  met. LTG   Close to meeting 4.  ·   ·   · Plan:   I highly recommend that she continue with PT. She continues to make steady progress with her posture, balance, gait, strength and overall function.     Electronically Signed by: Susan Glover, [id number]

## 2018-03-01 ENCOUNTER — HOSPITAL ENCOUNTER (OUTPATIENT)
Dept: OTHER | Age: 53
Discharge: HOME | End: 2018-03-31
Attending: ANESTHESIOLOGY | Admitting: ANESTHESIOLOGY

## 2018-03-06 ENCOUNTER — HOSPITAL ENCOUNTER (OUTPATIENT)
Dept: PHYSICAL THERAPY | Age: 53
Discharge: HOME | End: 2018-03-07
Admitting: ANESTHESIOLOGY

## 2018-03-06 NOTE — FLOWSHEET NOTE
Outpatient Physical Therapy     [x] Daily Treatment Note   [] Progress Note -  17 ,17,10/6/17,11/3/17 ,17.18.  18. 3/2/18 Bottom of this page. [] Discharge Note      Date:  3/6/2018        Patient Name:  Roya Orourke        :  1965    Restrictions/Precautions:      Diagnosis:  Complex pain syndrome. Treatment Diagnosis:  Same, RSD, decreased ability to walk  Insurance/Certification information:  [facility] has approval for 8 weeks- 16 visits                                                          Referring Physician:  Dr. Jae Jenkins of care signed (Y/N):      Visit# / total visits:              Authorized from:     18 to April (mid)         Pain level: 5/10 neck when lying down      5/10 neck, with walking, ADLs       5-7/10  Constant left arm(entire  Arm)    Subjective: 3/2/18 doing OK  18 doing OK but frustrated with not living on her own.  18 pt returns for therapy. Last seen almost 3 weeks ago. 18 reports she had a cervical injection that was helpful 2 days ago. She continues to use her AFO brace every day and use her soft cervical collar  18 therapist running 12 min late. She is doing OK  18 doing OK. Has been using her AFO brace- using it on the outside of her pants. 18 dong about the same. 18 states she tripped in the kitchen, caught herself on the sink. 17 voice is strong today  17 th eleft arm is more painful today- it is very cold outside and sometimes this bothers her more  12/15/17 doing better with her left arm pain today. Back to 5/10  12/12/17 fatigued today- did not sleep well. Her left arm pain is high today- burning pain. 17 pt is very pleased today. She used the [employer] for transportation- pleased to be more independent. 17 reports she is doing OK. Pt is frustrated with her home situation.    17 doing OK  17 pleased today that she did her own hair  17 doing OK  11/21/17 pain level is about the same. 11/17/17 doing OK  11/14/17 doing OK today, her left arm is a little less painful than on 11/3. Pain scale 6/10  11/7/17 doing well today. 11/3/17 pt returns to therapy today. Has approval for 24 visit s. Pain level is high today. 10/6/17 reports that she is more sore all over- perhaps due to the poor weather moving in.   10/3/17 her mouth is much better. She has 1 more approved visit after today- will wait on prog note til that day. 9/25/17 her mouth is still very sore but slowly improving  9/22/17 states she fell walking in the grass yesterday but was able to get up herself. Has had upper teeth extracted- very sore mouth  6/10 pain scale. 9/19/17 doing OK . About the sa ME. Going to the dentis today,having her teeth pulled. Thirsty and hungry from no food or water since midnight. 9/11/17 feeling better. No c/o sharp neck pain. 9/5/17 still having some sharp pain from her left shoulder up her neck but not daily. She has been to the dentist. (pleased)  9/1/17 having nichole[p pain from her left shoulder up to her neck yesterday. No reason. Severe stormy weekend ahead- weather? Have also been doing more cervical strengthening for posture and gait. Pain   Patient describes pain to be in her left arm, upper back and neck. Patient reports Pain scale 4-6/10  . She has an implanted TENS unit for pain. Worsened by - stays the same most of the time  Improved by - lying on the couch     Social History/Environment:   Patient lives with .her sister  . . in a 1 story house with 1 step in the garage    Prior Level of Function:    has been very limited for the past 5 years. Has help with bathing. She is able to dress herself. She is able to walk about 20 ft with a walker. Sits in a recliner.  Uses a walker to get to the bathroom.      Functional Complaints/ Current Level of Function:   Patient unable to .walk very far, states she has poor balance.      Patient goal for therapy:        11/3/17Be able to hold my head up more and walk better and not turn my foot in as much        Exercises:   Exercise/Equipment Resistance/Repetitions Other comments    12/19/16 cerv rot left  x5      Head,sh, elbow presses Hold 5 sec  X 5      Ankle- DF/PF  INV/EV 2x10     12/21/16 bridges           SLR       Dorsi flx       Heel raise     12/28/16 seated- core work- upper andlower trunk, cue for head position constantly , sit to stand, unlock the left knee, shift to the left 30 min      Manual- see below 15 min      gait   15 min  Attempted st cane- not able due to fear of falling Will work in llbars   Left foot- seated active eversion    2x10 and against resistance. 1/2/17-1/6/17 gait-llbars    30 min Foot placement, posture, head position, wt shift Feet apart, left foot out.     1/10/17-1/13/17 gait in ll bars 15 min          SLR, abd, heel raise 3x5 work on midline with head and left toe position. scap retraction, depression Sets of 5 x 5      Side step ups-left 2x10      1/24/17 sidelying left cerv side lift, abd of hip, and eversion of left ankle All 3x 5     3/31/17-4/14/17 forward and lateral step ups to the left   x30 with cues for wt shift, able to step forward without hands x 5 reps. Working on shifting wt left   Standing side leg raise and backwd rsies 3x5    4/5/17 bridges, bicycle legs, single leg bridge 2x10 each, given HO for HEP    4/19/17 outdoor walking on concrete, gravel,grass 300 ft x 2. SB asst, no cane. Only used cane on the gravel and grass (20 ft) A few stops to get centered and reposition head   4/28/17 combined step ups- 4-6 8 inch forward and step up15 min  and overs without UE support, doing saumya pad steps Working on wt shift to the left.     Seated on therapy ball-wt shift, knee lift, low kick, arm raise, trunk co-contraction x10-20 reps each    Ambulating on the sidewalk 200 ft            Left foot eversion with red 3x10    5/5/17 HEP: turn circles in 4 sec, single stand, tandem stand 2x day   mili HO     11/3/17-1/2/18 step ups- no hands6 in  3 in a row, many times Improved wt shifts     Turn her left foot into eversion Sets of 10 x 3    sidelying -lateral neck lifts 3x10    Prone cerv ext 3x10    Prone leg ext, then with R arm raise 3x10    Prone plank NV     . Prone knee flx to stretch ant thigh Hold 10 sec  5-7 each leg. Left more limited. Prone 4 pt   Elbows ad knees 2x10     torso stretching/ M-F release with 2 persons     Prone thoracic/cervical mobiliz costotransv jts, rot. Gait ex- step ups, rockerboard, stepping over small cone/wt, bosu    15 min     3/2/18  gait- wt shifting , stepping over cone, stepping onto saumya pads walking   10 min    neuromusc-balance skills  5 min    Balance,wt shifting, there ex while seated on green ball and in quadriped with red ball 30 min                       Therapeutic Exercise:    30  min  ,    Stretching and extremity ex   . resissted scapular ex            Group Therapy:      Home Exercise Program:       Therapeutic Activity:      Neuromuscular Re-education:       Balance  Mir score. 52/56. Gisselle Rojas imporved 5 points. !  1/3/1/18   e.  3/29/17 Mir balance test performed. Score= 47 ( improved 3 points). 41-50 range is low risk of falling. 21-40 is moderate risk. Pt scored 16/28 on the second page of skills (higher level)    Gait:   15 min             Manual Therapy:       30 min  ,cervical distraction, stretcing, mobiliz for rot, M-F release for left trunk and axilla.,,         5/26/17  30 min manual cerv distraction, stretching, PROM, 1st rib, thoracic stretch,  sttretcing and M-F release with sidelying over the red therapy ball.    .        Towel roll stretch x 3 horiz and 1 verticle       Modalities:      Timed Code Treatment Minutes:      75    Total Treatment Minutes:      85         Treatment/Activity Tolerance:    [x] Patient tolerated treatment well [] Patient limited by fatigue   [] Patient cones without min A. Decreased ability to wt shift to the left leg. Left foot position is still more adducted/supinated than I would like- even with the AFO- it is over the counter- may need a custom one.    1/31/18 improving head position/posture. Balance/gait score improved 7 points on the Murphy test.  52/56.    12/5/17 improving tone of the left side. Posture is more easily achieved. Inversion of the left foot is still problematic  11/3/17 improving head position and left foot position but her abnormal tone in her left foot overrides her ability to control it. I believe she will benefit from an AFO to make her more stable and independent. appled tape to her cervical spine today to offer a cue for imporved head palcment- appeared beneficial.           9/1/17 improving head osition and left foot position- still requires many cues- verble and tactile for good walking posture, othewise returns to left foot inversion and cerv rot R/SB. 11/3/17 murphy balance test: 50/56. Low risk of falls. Scores low on tandem stand and single leg stand,  Has poor head position with walking (rot R and SB R), inversion of her left foot both due to the RSD. Minimal to no use of her left arm. Assessment:  G Code: [id number] CJ  Summary:  .   Seen x   3visits over the past 2 weeks. Patient's response to treatment: good. Works extremely hard,  She is pleased with her progress. .. She would like to eventually return to her own home in the near future. Goals:  G code :  CJ  Short Term Goals ( 4 weeks) Long Term Goals ( 8 weeks)   1). Establish HEP 1). (I) HEP   2). imporive cervical position 35% 2). improve cervical position  75%   3). cervical strength to 4- 3). cervical strength to 4+   4). tandem stand left 30 sec  Right 20 sec. Single stnd 5 sec 4). good balance. Murphy score to 53-55/56   5). Ambulate for 10 min continuously 5). ambulate continuously for 20 min   6). Consider custom AFO 6). increase activiy

## 2018-04-01 ENCOUNTER — HOSPITAL ENCOUNTER (OUTPATIENT)
Dept: OTHER | Age: 53
Discharge: HOME | End: 2018-04-30
Attending: ANESTHESIOLOGY | Admitting: ANESTHESIOLOGY

## 2018-04-03 ENCOUNTER — HOSPITAL ENCOUNTER (OUTPATIENT)
Dept: PHYSICAL THERAPY | Age: 53
Discharge: HOME | End: 2018-04-04
Admitting: ANESTHESIOLOGY

## 2018-04-13 ENCOUNTER — HOSPITAL ENCOUNTER (OUTPATIENT)
Dept: PHYSICAL THERAPY | Age: 53
Discharge: HOME | End: 2018-04-14
Admitting: ANESTHESIOLOGY

## 2018-04-24 ENCOUNTER — HOSPITAL ENCOUNTER (OUTPATIENT)
Dept: PHYSICAL THERAPY | Age: 53
Discharge: HOME | End: 2018-04-25
Admitting: ANESTHESIOLOGY

## 2018-05-01 ENCOUNTER — HOSPITAL ENCOUNTER (OUTPATIENT)
Dept: OTHER | Age: 53
Discharge: HOME | End: 2018-05-31
Attending: ANESTHESIOLOGY | Admitting: ANESTHESIOLOGY

## 2018-05-04 ENCOUNTER — HOSPITAL ENCOUNTER (OUTPATIENT)
Dept: PHYSICAL THERAPY | Age: 53
Discharge: HOME | End: 2018-05-05
Admitting: ANESTHESIOLOGY

## 2018-05-08 ENCOUNTER — HOSPITAL ENCOUNTER (OUTPATIENT)
Dept: PHYSICAL THERAPY | Age: 53
Discharge: HOME | End: 2018-05-09
Admitting: ANESTHESIOLOGY

## 2018-05-22 ENCOUNTER — HOSPITAL ENCOUNTER (OUTPATIENT)
Dept: PHYSICAL THERAPY | Age: 53
Discharge: HOME | End: 2018-05-23
Admitting: ANESTHESIOLOGY

## 2018-06-01 ENCOUNTER — HOSPITAL ENCOUNTER (OUTPATIENT)
Dept: OTHER | Age: 53
Discharge: HOME | End: 2018-06-30
Attending: ANESTHESIOLOGY | Admitting: ANESTHESIOLOGY

## 2018-08-10 ENCOUNTER — HOSPITAL ENCOUNTER (OUTPATIENT)
Dept: PHYSICAL THERAPY | Age: 53
Setting detail: THERAPIES SERIES
Discharge: HOME | End: 2018-08-10
Payer: COMMERCIAL

## 2018-08-10 PROCEDURE — G8978 MOBILITY CURRENT STATUS: HCPCS

## 2018-08-10 PROCEDURE — 97140 MANUAL THERAPY 1/> REGIONS: CPT

## 2018-08-10 PROCEDURE — 97112 NEUROMUSCULAR REEDUCATION: CPT

## 2018-08-10 PROCEDURE — 97164 PT RE-EVAL EST PLAN CARE: CPT

## 2018-08-10 PROCEDURE — G8979 MOBILITY GOAL STATUS: HCPCS

## 2018-08-10 NOTE — PROGRESS NOTES
x 2.  SB asst, no cane. Only used cane on the gravel and grass (20 ft) A few stops to get centered and reposition head   4/28/17 combined step ups- 4-6 8 inch forward and step up15 min  and overs without UE support, doing saumya pad steps Working on wt shift to the left. Seated on therapy ball-wt shift, knee lift, low kick, arm raise, trunk co-contraction x10-20 reps each    Ambulating on the sidewalk 200 ft            Left foot eversion with red 3x10    5/5/17 HEP: turn circles in 4 sec, single stand, tandem stand 2x day   gvien HO     11/3/17-1/2/18 step ups- no hands6 in  3 in a row, many times Improved wt shifts     Turn her left foot into eversion Sets of 10 x 3    sidelying -lateral neck lifts 3x10    Prone cerv ext 3x10    Prone leg ext, then with R arm raise 3x10    Prone plank NV     . Prone knee flx to stretch ant thigh Hold 10 sec  5-7 each leg. Left more limited. Prone 4 pt   Elbows ad knees 2x10     torso stretching/ M-F release with 2 persons     Prone thoracic/cervical mobiliz costotransv jts, rot. Gait ex- step ups, rockerboard, stepping over small cone/wt, bosu          3/2/18 -5/22/18 gait- wt shifting , stepping over cone, stepping onto saumya pads walking, around obstacles    min    neuromusc-balance skills    min    Balance,wt shifting, there ex while seated on  red ball-trunk isometrics, co contraction, cervical isometrics, ball touches,   ,and in quadriped with red ball for trunk support, ball toss seated on ball, ball toss in standing using light beach ball, bunting the ball in standing,wt shifts     min                       Therapeutic Exercise:       min  ,    Stretching and extremity ex   . resissted scapular ex. Left UE ex.-ceiling punch, tricep, shoulder IR/ER. Will work on [employer] abd/add. Group Therapy:      Home Exercise Program:       Therapeutic Activity:      Neuromuscular Re-education:  15 min         re eval:  30 min    Gait:      min     Wt shift focus. RSD or complex pain syndrome) that limit her quality and quantity of walking and self care tasks such as washing and fixing her hair. G Code:  CJ  Summary:  . Patient's response to treatment: good. Works extremely hard,   . She would like to eventually return to her own home in the near future. She would like to be more independent with her self care/grooming, and medications     Goals:  G code :  CJ  Short Term Goals ( 4 weeks) Long Term Goals ( 8 weeks)   1). Establish HEP 1). (I) HEP   2). imporive cervical position 35% 2). improve cervical position  75%   3). cervical strength to 4-. Improve LE strength 1/3 mmt 3). cervical strength to 4+. Improve LE MMT 2/3 MMT   4). tandem stand left  15 sec  Right 20 sec. Single stnd 5 sec 4). good balance. Mir score to 53-55/56   5). Ambulate for 10 min continuously 5). ambulate continuously for 20 min   6). Consider custom AFO 6). increase activiy  75%         Progress toward previous goals:    Re established today 8/10/18  ·   ·   Plan:    Continue PT 2x wk for the next 8 weeks. She will be OOT for 3 weeks so will need an extension on her visits. .  Also a C9 for a custom AFO for her left LE.     Electronically Signed by: MINGO [id number]

## 2018-08-14 ENCOUNTER — HOSPITAL ENCOUNTER (OUTPATIENT)
Dept: PHYSICAL THERAPY | Age: 53
Setting detail: THERAPIES SERIES
Discharge: HOME | End: 2018-08-14
Payer: COMMERCIAL

## 2018-08-14 PROCEDURE — 97140 MANUAL THERAPY 1/> REGIONS: CPT

## 2018-08-14 PROCEDURE — 97110 THERAPEUTIC EXERCISES: CPT

## 2018-08-14 PROCEDURE — 97116 GAIT TRAINING THERAPY: CPT

## 2018-08-14 PROCEDURE — 97112 NEUROMUSCULAR REEDUCATION: CPT

## 2018-08-14 NOTE — FLOWSHEET NOTE
did not sleep well. Her left arm pain is high today- burning pain. 12/8/17 pt is very pleased today. She used the [employer] for transportation- pleased to be more independent. 12/5/17 reports she is doing OK. Pt is frustrated with her home situation. 12/1/17 doing OK      Pain        Social History/Environment:   Patient lives with .her sister  . . in a 1 story house with 1 step in the garage    Prior Level of Function:  From initial eval:        Functional Complaints/ Current Level of Function:         Patient goal for therapy: 8/10/18 walk better. live on my own. Have better balance. 11/3/17Be able to hold my head up more and walk better and not turn my foot in as much        Exercises:   Exercise/Equipment Resistance/Repetitions Other comments    12/19/16 cerv rot left  x5      Head,sh, elbow presses Hold 5 sec  X 5      Ankle- DF/PF  INV/EV 2x10     12/21/16 bridges           SLR       Dorsi flx       Heel raise     12/28/16 seated- core work- upper andlower trunk, cue for head position constantly , sit to stand, unlock the left knee, shift to the left 30 min      Manual- see below 15 min      gait   15 min  Attempted st cane- not able due to fear of falling Will work in llbars   Left foot- seated active eversion    2x10 and against resistance. 1/2/17-1/6/17 gait-llbars    30 min Foot placement, posture, head position, wt shift Feet apart, left foot out.     1/10/17-1/13/17 gait in ll bars 15 min          SLR, abd, heel raise 3x5 work on midline with head and left toe position. scap retraction, depression Sets of 5 x 5      Side step ups-left 2x10      1/24/17 sidelying left cerv side lift, abd of hip, and eversion of left ankle All 3x 5     3/31/17-4/14/17 forward and lateral step ups to the left   x30 with cues for wt shift, able to step forward without hands x 5 reps.  Working on shifting wt left   Standing side leg raise and backwd rsies 3x5    4/5/17 bridges, bicycle legs, single leg bridge 2x10 each, given HO for HEP    4/19/17 outdoor walking on concrete, gravel,grass 300 ft x 2. SB asst, no cane. Only used cane on the gravel and grass (20 ft) A few stops to get centered and reposition head   4/28/17 combined step ups- 4-6 8 inch forward and step up15 min  and overs without UE support, doing saumya pad steps Working on wt shift to the left. Seated on therapy ball-wt shift, knee lift, low kick, arm raise, trunk co-contraction x10-20 reps each    Ambulating on the sidewalk 200 ft            Left foot eversion with red 3x10    5/5/17 HEP: turn circles in 4 sec, single stand, tandem stand 2x day   gvien HO     11/3/17-1/2/18 step ups- no hands6 in  3 in a row, many times Improved wt shifts     Turn her left foot into eversion Sets of 10 x 3    sidelying -lateral neck lifts 3x10    Prone cerv ext 3x10    Prone leg ext, then with R arm raise 3x10    Prone plank NV     . Prone knee flx to stretch ant thigh Hold 10 sec  5-7 each leg. Left more limited. Prone 4 pt   Elbows ad knees 2x10     torso stretching/ M-F release with 2 persons     Prone thoracic/cervical mobiliz costotransv jts, rot. Gait ex- step ups, rockerboard, stepping over small cone/wt, bosu          3/2/18 -5/22/18 gait- wt shifting , stepping over cone, stepping onto saumya pads walking, around obstacles    min    neuromusc-balance skills    min     8/14/18 balance/gait- alternating steps on 4 in, wt shifting, high marching in place and traveling, wt shifting, static balance with hi knee      30 min                       Therapeutic Exercise:     15  min  ,    Stretching and extremity ex   . resissted scapular ex. Left UE ex.-ceiling punch, tricep, shoulder IR/ER. Will work on [employer] abd/add. Light finger/hand ROM            Group Therapy:      Home Exercise Program:       Therapeutic Activity:      Neuromuscular Re-education:  15 min         re eval:      Gait:     15 min     Wt shift focus.  Turning her left toe out, head back and central.        Manual Therapy:      15min  ,cervical distraction, stretcing, mobiliz for rot, M-F release for left trunk and axilla.,, left costotransv jts.        17  30 min manual cerv distraction, stretching, PROM, 1st rib, thoracic stretch,  sttretcing and M-F release with sidelying over the red therapy ball. .              Modalities:      Timed Code Treatment Minutes:     60             Charges:   ex, man, NM,gt    Total Treatment Minutes:      62         Treatment/Activity Tolerance:    [x] Patient tolerated treatment well [] Patient limited by fatigue   [] Patient limited by pain- left shoulder and elbow/forearm. [x] Patient limited by other medical complications-RSD  [] Other:     Prognosis: [x] Good [] Fair  [] Poor    Patient Requires Follow-up:  [x] Yes  [] No    Plan: [x] Continue per plan of care -        [] Alter current plan (see comments)   [] Plan of care initiated [] Hold pending MD visit- needs new C9 [] Discharge          See Progress Note: [] Yes  [x] No       Next due:  18           Electronically signed by:  Jorgito Seymour, [address] [place] [address] Road          Date:  2018- re start therapy     Patient Name:  Manuel Boo      :  1965    Restrictions/Precautions:      Diagnosis:  Complex pain syndrome    Treatment Diagnosis:  Same, decreased ability to walk, poor balance, weakness    Insurance/Certification information: [facility]    Approved 18-18  2-3x wk for 8 weeks    Referring Physician:  Dr. Smiley Ferrell of care signed (Y/N):      Visit# / total visits:    -    Pain level:  neck: 5/10     Thoracic:3-4/10      Lumbar:  2-5/10. UE:  Left 3-5/10   Right  1/10. LE:  Left 2-4/10 for her hip,thigh,foot. Rt: 2/10 hip,thigh    Progress Note covers period from:     Re eval  8/10/18. To date on this note. Subjective:   8/10/18 pleased with new tens implant. Excited to visit her parents in 5 weeks( hasn't seen them in years).   Having difficulty with ongoing pain- herneck,LB, and left UE are the worst.  Has minimal use of her left arm  5/22/18 able to use her therapy ball at home for 2 sessions a day, about 15 min each. Working on trunk control, strength, midline positioning. She would like to be more independent with her grooming and medications at home. .      Objective:  8/10 18     Gait: She continues to have difficulty keeping her head up- usually flexed and rot R. Her left shoulder girdle is protracted from holding it against her body for years and the left hip is retracted, also has extensor tone in her left LE with left foot adduction and supination even with an OTC AFO. She has decreased wt shift onto her left leg. RENO test:  44/56 possible. ( 41-56 is considered a low risk of falls)Low risk of falls. Most limited with wt shifting for placing her feet alternating on a step, standing in tandem , single stand,  turning a Craig that requires wt shifting. MMT:               Left                Right  Hip flx                   4-                    5         ext                  4-                    4+         abd                 4                      4+         Add                 3+                   4          IR                4 to 4+               5          ER                 3+                 4 to 4+  Knee flx                4-                     5            Ext               4-                     5  Dorsi flx                4-                     5  Plant flx                 3                      4  Inversion               4+                    5  Eversion                3+                    5     Balance:  Single stand:  Left 2 sec   Right  6 sec.             Tandem:  Left 10 sec     Right  8 sec                    Assessment:  Pt presents with many postural changes when static and with walking ( from progressive RSD or complex pain syndrome) that limit her quality and quantity of walking and self care tasks such as washing and fixing her hair. G Code:  CJ  Summary:  . Patient's response to treatment: good. Works extremely hard,   . She would like to eventually return to her own home in the near future. She would like to be more independent with her self care/grooming, and medications     Goals:  G code :  CJ  Short Term Goals ( 4 weeks) Long Term Goals ( 8 weeks)   1). Establish HEP 1). (I) HEP   2). imporive cervical position 35% 2). improve cervical position  75%   3). cervical strength to 4-. Improve LE strength 1/3 mmt 3). cervical strength to 4+. Improve LE MMT 2/3 MMT   4). tandem stand left  15 sec  Right 20 sec. Single stnd 5 sec 4). good balance. Mir score to 53-55/56   5). Ambulate for 10 min continuously 5). ambulate continuously for 20 min   6). Consider custom AFO 6). increase activiy  75%         Progress toward previous goals:    Re established today 8/10/18  ·   ·   Plan:    Continue PT 2x wk for the next 8 weeks. She will be OOT for 3 weeks so will need an extension on her visits. .  Also a C9 for a custom AFO for her left LE.     Electronically Signed by: Srinivasan Hampton, [id number]

## 2018-08-28 ENCOUNTER — HOSPITAL ENCOUNTER (OUTPATIENT)
Dept: PHYSICAL THERAPY | Age: 53
Setting detail: THERAPIES SERIES
Discharge: HOME | End: 2018-08-28
Payer: COMMERCIAL

## 2018-08-28 PROCEDURE — 97112 NEUROMUSCULAR REEDUCATION: CPT

## 2018-08-28 PROCEDURE — 97110 THERAPEUTIC EXERCISES: CPT

## 2018-08-28 PROCEDURE — 97140 MANUAL THERAPY 1/> REGIONS: CPT

## 2018-08-28 NOTE — FLOWSHEET NOTE
to the left   x30 with cues for wt shift, able to step forward without hands x 5 reps. Working on shifting wt left   Standing side leg raise and backwd rsies 3x5    4/5/17 bridges, bicycle legs, single leg bridge 2x10 each, given HO for HEP    4/19/17 outdoor walking on concrete, gravel,grass 300 ft x 2. SB asst, no cane. Only used cane on the gravel and grass (20 ft) A few stops to get centered and reposition head   4/28/17 combined step ups- 4-6 8 inch forward and step up15 min  and overs without UE support, doing saumya pad steps Working on wt shift to the left. Seated on therapy ball-wt shift, knee lift, low kick, arm raise, trunk co-contraction x10-20 reps each    Ambulating on the sidewalk 200 ft            Left foot eversion with red 3x10    5/5/17 HEP: turn circles in 4 sec, single stand, tandem stand 2x day   gvien HO     11/3/17-1/2/18 step ups- no hands6 in  3 in a row, many times Improved wt shifts     Turn her left foot into eversion Sets of 10 x 3    sidelying -lateral neck lifts 3x10    Prone cerv ext 3x10    Prone leg ext, then with R arm raise 3x10    Prone plank NV     . Prone knee flx to stretch ant thigh Hold 10 sec  5-7 each leg. Left more limited. Prone 4 pt   Elbows ad knees 2x10     torso stretching/ M-F release with 2 persons     Prone thoracic/cervical mobiliz costotransv jts, rot. Gait ex- step ups, rockerboard, stepping over small cone/wt, bosu          3/2/18 -5/22/18 gait- wt shifting , stepping over cone, stepping onto saumya pads walking, around obstacles    min    neuromusc-balance skills    min     8/14/18 -8/27/18 balance/gait- alternating steps on 4 in, wt shifting, high marching in place and traveling, wt shifting, static balance with hi knee     15 min                       Therapeutic Exercise:     15  min  ,    Stretching and extremity ex   . resissted scapular ex. Left UE ex.-ceiling punch, tricep, shoulder IR/ER. Will work on [employer] abd/add.  Light finger/hand 5     Balance:  Single stand:  Left 2 sec   Right  6 sec. Tandem:  Left 10 sec     Right  8 sec                    Assessment:  Pt presents with many postural changes when static and with walking ( from progressive RSD or complex pain syndrome) that limit her quality and quantity of walking and self care tasks such as washing and fixing her hair. G Code:  CJ  Summary:  . Patient's response to treatment: good. Works extremely hard,   . She would like to eventually return to her own home in the near future. She would like to be more independent with her self care/grooming, and medications     Goals:  G code :  CJ  Short Term Goals ( 4 weeks) Long Term Goals ( 8 weeks)   1). Establish HEP 1). (I) HEP   2). imporive cervical position 35% 2). improve cervical position  75%   3). cervical strength to 4-. Improve LE strength 1/3 mmt 3). cervical strength to 4+. Improve LE MMT 2/3 MMT   4). tandem stand left  15 sec  Right 20 sec. Single stnd 5 sec 4). good balance. Mir score to 53-55/56   5). Ambulate for 10 min continuously 5). ambulate continuously for 20 min   6). Consider custom AFO 6). increase activiy  75%         Progress toward previous goals:    Re established today 8/10/18  ·   ·   Plan:    Continue PT 2x wk for the next 8 weeks. She will be OOT for 3 weeks so will need an extension on her visits. .  Also a C9 for a custom AFO for her left LE.     Electronically Signed by: Michael Pacheco, [id number]

## 2018-08-31 ENCOUNTER — HOSPITAL ENCOUNTER (OUTPATIENT)
Dept: PHYSICAL THERAPY | Age: 53
Setting detail: THERAPIES SERIES
Discharge: HOME | End: 2018-08-31
Payer: COMMERCIAL

## 2018-08-31 PROCEDURE — 97140 MANUAL THERAPY 1/> REGIONS: CPT

## 2018-08-31 PROCEDURE — 97110 THERAPEUTIC EXERCISES: CPT

## 2018-08-31 PROCEDURE — 97116 GAIT TRAINING THERAPY: CPT

## 2018-08-31 PROCEDURE — 97112 NEUROMUSCULAR REEDUCATION: CPT

## 2018-08-31 NOTE — FLOWSHEET NOTE
Outpatient Physical Therapy     [x] Daily Treatment Note   [] Progress Note -     18, 18,  18 , 8/10/10 re start. - Bottom of this page. [] Discharge Note      Date:  2018        Patient Name:  Kenrick Schmitz        :  1965    Restrictions/Precautions:      Diagnosis:  Complex pain syndrome. Treatment Diagnosis:  Same, RSD, decreased ability to walk  Insurance/Certification information:  [facility] has approval for 8 weeks- 16 visits                                                          Referring Physician:  Dr. Darron Fothergill of care signed (Y/N):      Visit# / total visits: -         prog note due  On her last visit          Authorized til    2-3 x wk for  8 weeks  18-   18    Pain level: 4-5/10 neck when lying down      4-5/10 neck, with walking, ADLs        3-4/10  Constant left arm(entire  Arm)     Left le-4/10 at her hip,thigh, foot                                                                                                                                                1/10  R arm                                        Right le/10 hip and thigh      Subjective: 18 more upbeat today  18 not as stressed today. 18 reports she is stressed today. Has her ankle wt and neck collar on. Not wearing her AFO today. 18 has her ankle wt today, not wearing her AFO  brace. 18 doing about the same. Didn't wear her AFO or ankle weight. She will start wearing them again. 8/10/18 pt is returning to re start therapy. Last seen 18. Pain scale above. doing about the same. Stressed with her home life. She had a new neurotransmitter implanted 18. She is pleased as it helps her pain level. 18 last seen 11 days ago. Has had transportation problems and therapist vacation. She is still stressed at home. States she is sitting on her therapy ball 2x day, working on wt shifts and balance. 18 stressed at home.   18 sink.  12/29/17 voice is strong today  12/26/17 th eleft arm is more painful today- it is very cold outside and sometimes this bothers her more  12/15/17 doing better with her left arm pain today. Back to 5/10  12/12/17 fatigued today- did not sleep well. Her left arm pain is high today- burning pain. 12/8/17 pt is very pleased today. She used the [employer] for transportation- pleased to be more independent. 12/5/17 reports she is doing OK. Pt is frustrated with her home situation. 12/1/17 doing OK      Pain        Social History/Environment:   Patient lives with .her sister  . . in a 1 story house with 1 step in the garage    Prior Level of Function:  From initial eval:        Functional Complaints/ Current Level of Function:         Patient goal for therapy: 8/10/18 walk better. live on my own. Have better balance. 11/3/17Be able to hold my head up more and walk better and not turn my foot in as much        Exercises:   Exercise/Equipment Resistance/Repetitions Other comments    12/19/16 cerv rot left  x5      Head,sh, elbow presses Hold 5 sec  X 5      Ankle- DF/PF  INV/EV 2x10     12/21/16 bridges           SLR       Dorsi flx       Heel raise     12/28/16 seated- core work- upper andlower trunk, cue for head position constantly , sit to stand, unlock the left knee, shift to the left 30 min      Manual- see below 15 min      gait   15 min  Attempted st cane- not able due to fear of falling Will work in llbars   Left foot- seated active eversion    2x10 and against resistance. 1/2/17-1/6/17 gait-llbars    30 min Foot placement, posture, head position, wt shift Feet apart, left foot out.     1/10/17-1/13/17 gait in ll bars 15 min          SLR, abd, heel raise 3x5 work on midline with head and left toe position.         scap retraction, depression Sets of 5 x 5      Side step ups-left 2x10      1/24/17 sidelying left cerv side lift, abd of hip, and eversion of left ankle All 3x 5 3/31/17-4/14/17 forward and lateral step ups to the left   x30 with cues for wt shift, able to step forward without hands x 5 reps. Working on shifting wt left   Standing side leg raise and backwd rsies 3x5    4/5/17 bridges, bicycle legs, single leg bridge 2x10 each, given HO for HEP    4/19/17 outdoor walking on concrete, gravel,grass 300 ft x 2. SB asst, no cane. Only used cane on the gravel and grass (20 ft) A few stops to get centered and reposition head   4/28/17 combined step ups- 4-6 8 inch forward and step up15 min  and overs without UE support, doing saumya pad steps Working on wt shift to the left. Seated on therapy ball-wt shift, knee lift, low kick, arm raise, trunk co-contraction x10-20 reps each    Ambulating on the sidewalk 200 ft            Left foot eversion with red 3x10    5/5/17 HEP: turn circles in 4 sec, single stand, tandem stand 2x day   gvien HO     11/3/17-1/2/18 step ups- no hands6 in  3 in a row, many times Improved wt shifts     Turn her left foot into eversion Sets of 10 x 3    sidelying -lateral neck lifts 3x10    Prone cerv ext 3x10    Prone leg ext, then with R arm raise 3x10    Prone plank NV     . Prone knee flx to stretch ant thigh Hold 10 sec  5-7 each leg. Left more limited. Prone 4 pt   Elbows ad knees 2x10     torso stretching/ M-F release with 2 persons     Prone thoracic/cervical mobiliz costotransv jts, rot. Gait ex- step ups, rockerboard, stepping over small cone/wt, bosu          3/2/18 -5/22/18 gait- wt shifting , stepping over cone, stepping onto saumya pads walking, around obstacles  15  min    neuromusc-balance skills    min     8/14/18 -8/31/18 balance/gait- alternating steps on 4 in, wt shifting, high marching in place and traveling, wt shifting, static balance with hi knee     15 min                       Therapeutic Exercise:     15  min  ,    Stretching and extremity ex   . resissted scapular ex. Left UE ex.-ceiling punch, tricep, shoulder IR/ER. Will work on [place] abd/add. Light finger/hand ROM            Group Therapy:      Home Exercise Program:       Therapeutic Activity:      Neuromuscular Re-education:  15 min balance. Leaning to R over red ball on mat table and lefting up to neutral.  Balance in quadriped over red ball. Co-trunk contractions. Beach ball too and catch with shift to the left   re eval:      Gait:    15   min     . Manual Therapy:      15min  ,cervical distraction, stretcing, mobiliz for rot, M-F release for left trunk and axilla.,, left costotransv jts.        17  30 min manual cerv distraction, stretching, PROM, 1st rib, thoracic stretch,  sttretcing and M-F release with sidelying over the red therapy ball. .              Modalities:      Timed Code Treatment Minutes:    60             Charges:   ex, man, NM, gait    Total Treatment Minutes:       60         Treatment/Activity Tolerance:    [x] Patient tolerated treatment well [] Patient limited by fatigue   [] Patient limited by pain- left shoulder and elbow/forearm.  [x] Patient limited by other medical complications-RSD  [] Other:     Prognosis: [x] Good [] Fair  [] Poor    Patient Requires Follow-up:  [x] Yes  [] No    Plan: [x] Continue per plan of care -        [] Alter current plan (see comments)   [] Plan of care initiated [] Hold pending MD visit- needs new C9 [] Discharge          See Progress Note: [] Yes  [x] No       Next due:      sept     Electronically signed by:  Jeremy Maldonado, [address] [place] [address] Road          Date:  2018- re start therapy     Patient Name:  Morgan Ganser      :  1965    Restrictions/Precautions:      Diagnosis:  Complex pain syndrome    Treatment Diagnosis:  Same, decreased ability to walk, poor balance, weakness    Insurance/Certification information: [facility]    Approved 18-18  2-3x wk for 8 weeks    Referring Physician:  Dr. Donavon Penaloza of care signed (Y/N):      Visit# / total visits:    -    Pain level: neck: 5/10     Thoracic:3-4/10      Lumbar:  2-5/10. UE:  Left 3-5/10   Right  1/10. LE:  Left 2-4/10 for her hip,thigh,foot. Rt: 2/10 hip,thigh    Progress Note covers period from:     Re ole  8/10/18. To date on this note. Subjective:   8/10/18 pleased with new tens implant. Excited to visit her parents in 5 weeks( hasn't seen them in years). Having difficulty with ongoing pain- herneck,LB, and left UE are the worst.  Has minimal use of her left arm  5/22/18 able to use her therapy ball at home for 2 sessions a day, about 15 min each. Working on trunk control, strength, midline positioning. She would like to be more independent with her grooming and medications at home. .      Objective:  8/10 18     Gait: She continues to have difficulty keeping her head up- usually flexed and rot R. Her left shoulder girdle is protracted from holding it against her body for years and the left hip is retracted, also has extensor tone in her left LE with left foot adduction and supination even with an OTC AFO. She has decreased wt shift onto her left leg. RENO test:  44/56 possible. ( 41-56 is considered a low risk of falls)Low risk of falls. Most limited with wt shifting for placing her feet alternating on a step, standing in tandem , single stand,  turning a [place] that requires wt shifting.           MMT:               Left                Right  Hip flx                   4-                    5         ext                  4-                    4+         abd                 4                      4+         Add                 3+                   4          IR                4 to 4+               5          ER                 3+                 4 to 4+  Knee flx                4-                     5            Ext               4-                     5  Dorsi flx                4-                     5  Plant flx                 3                      4  Inversion               4+ 5  Eversion                3+                    5     Balance:  Single stand:  Left 2 sec   Right  6 sec. Tandem:  Left 10 sec     Right  8 sec                    Assessment:  Pt presents with many postural changes when static and with walking ( from progressive RSD or complex pain syndrome) that limit her quality and quantity of walking and self care tasks such as washing and fixing her hair. G Code:  CJ  Summary:  . Patient's response to treatment: good. Works extremely hard,   . She would like to eventually return to her own home in the near future. She would like to be more independent with her self care/grooming, and medications     Goals:  G code :  CJ  Short Term Goals ( 4 weeks) Long Term Goals ( 8 weeks)   1). Establish HEP 1). (I) HEP   2). imporive cervical position 35% 2). improve cervical position  75%   3). cervical strength to 4-. Improve LE strength 1/3 mmt 3). cervical strength to 4+. Improve LE MMT 2/3 MMT   4). tandem stand left  15 sec  Right 20 sec. Single stnd 5 sec 4). good balance. Mir score to 53-55/56   5). Ambulate for 10 min continuously 5). ambulate continuously for 20 min   6). Consider custom AFO 6). increase activiy  75%         Progress toward previous goals:    Re established today 8/10/18  ·   ·   Plan:    Continue PT 2x wk for the next 8 weeks. She will be OOT for 3 weeks so will need an extension on her visits. .  Also a C9 for a custom AFO for her left LE.     Electronically Signed by: Jeremy Maldonado, [id number]

## 2018-09-04 ENCOUNTER — HOSPITAL ENCOUNTER (OUTPATIENT)
Dept: PHYSICAL THERAPY | Age: 53
Setting detail: THERAPIES SERIES
Discharge: HOME | End: 2018-09-04
Payer: COMMERCIAL

## 2018-09-04 PROCEDURE — 97116 GAIT TRAINING THERAPY: CPT

## 2018-09-04 PROCEDURE — 97110 THERAPEUTIC EXERCISES: CPT

## 2018-09-04 PROCEDURE — 97112 NEUROMUSCULAR REEDUCATION: CPT

## 2018-09-04 PROCEDURE — 97140 MANUAL THERAPY 1/> REGIONS: CPT
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
## 2018-09-11 ENCOUNTER — HOSPITAL ENCOUNTER (OUTPATIENT)
Dept: PHYSICAL THERAPY | Age: 53
Setting detail: THERAPIES SERIES
Discharge: HOME | End: 2018-09-11
Payer: COMMERCIAL

## 2018-09-11 PROCEDURE — 97112 NEUROMUSCULAR REEDUCATION: CPT

## 2018-09-11 PROCEDURE — 97140 MANUAL THERAPY 1/> REGIONS: CPT

## 2018-09-11 PROCEDURE — 97116 GAIT TRAINING THERAPY: CPT

## 2018-09-11 PROCEDURE — 97110 THERAPEUTIC EXERCISES: CPT
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
## 2018-10-10 ENCOUNTER — APPOINTMENT (OUTPATIENT)
Dept: PHYSICAL THERAPY | Age: 53
End: 2018-10-10
Payer: COMMERCIAL

## 2018-10-12 ENCOUNTER — HOSPITAL ENCOUNTER (OUTPATIENT)
Dept: PHYSICAL THERAPY | Age: 53
Setting detail: THERAPIES SERIES
Discharge: HOME | End: 2018-10-12
Payer: COMMERCIAL

## 2018-10-12 PROCEDURE — 97140 MANUAL THERAPY 1/> REGIONS: CPT

## 2018-10-12 PROCEDURE — 97116 GAIT TRAINING THERAPY: CPT
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
## 2018-10-19 ENCOUNTER — HOSPITAL ENCOUNTER (OUTPATIENT)
Dept: PHYSICAL THERAPY | Age: 53
Setting detail: THERAPIES SERIES
Discharge: HOME | End: 2018-10-19
Payer: COMMERCIAL

## 2018-10-19 PROCEDURE — 97140 MANUAL THERAPY 1/> REGIONS: CPT

## 2018-10-19 PROCEDURE — 97110 THERAPEUTIC EXERCISES: CPT

## 2018-10-19 NOTE — FLOWSHEET NOTE
3).cervical strength to 4-. Improve LE strength 1/3 mmt 3). cervical strength to 4+. Improve LE MMT 2/3 MMT   4). tandem stand left  15 sec  Right 20 sec. Single stnd 5 sec 4). good balance. Mir score to 53-55/56   5). Ambulate for 10 min continuously 5). ambulate continuously for 20 min   6). Consider custom AFO 6). increase activiy  75%         Progress toward previous goals:   STG 1,2,5 met. ·   ·   Plan:    Continue PT 2x wk for the  8 -12 more sessions. Will have to have the date extended to early Nov due to her trip. Also a C9 for a custom AFO for her left LE.     Electronically Signed by: Amanda Weaver, [id number]

## 2018-10-23 ENCOUNTER — HOSPITAL ENCOUNTER (OUTPATIENT)
Dept: PHYSICAL THERAPY | Age: 53
Setting detail: THERAPIES SERIES
Discharge: HOME | End: 2018-10-23
Payer: COMMERCIAL

## 2018-10-23 PROCEDURE — 97140 MANUAL THERAPY 1/> REGIONS: CPT

## 2018-10-23 PROCEDURE — 97112 NEUROMUSCULAR REEDUCATION: CPT

## 2018-10-23 PROCEDURE — 97110 THERAPEUTIC EXERCISES: CPT
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
## 2018-10-26 ENCOUNTER — HOSPITAL ENCOUNTER (OUTPATIENT)
Dept: PHYSICAL THERAPY | Age: 53
Setting detail: THERAPIES SERIES
Discharge: HOME | End: 2018-10-26
Payer: COMMERCIAL

## 2018-10-26 PROCEDURE — 97140 MANUAL THERAPY 1/> REGIONS: CPT

## 2018-10-26 PROCEDURE — 97110 THERAPEUTIC EXERCISES: CPT

## 2018-10-26 PROCEDURE — 97116 GAIT TRAINING THERAPY: CPT

## 2018-10-26 NOTE — FLOWSHEET NOTE
pain.  3/23/18 she is excited about her new dentures. She will have them next thrus. 3/14/18 difficult day yesterday  3/9/18 doing OK.  3/2/18 doing OK  2/28/18 doing OK but frustrated with not living on her own.  2/19/18 pt returns for therapy. Last seen almost 3 weeks ago. 1/31/18 reports she had a cervical injection that was helpful 2 days ago. She continues to use her AFO brace every day and use her soft cervical collar  1/16/18 therapist running 12 min late. She is doing OK  1/12/18 doing OK. Has been using her AFO brace- using it on the outside of her pants. 1/5/18 dong about the same. 1/2/18 states she tripped in the kitchen, caught herself on the sink. 12/29/17 voice is strong today  12/26/17 th eleft arm is more painful today- it is very cold outside and sometimes this bothers her more  12/15/17 doing better with her left arm pain today. Back to 5/10  12/12/17 fatigued today- did not sleep well. Her left arm pain is high today- burning pain. 12/8/17 pt is very pleased today. She used the [employer] for transportation- pleased to be more independent. 12/5/17 reports she is doing OK. Pt is frustrated with her home situation. 12/1/17 doing OK      Pain        Social History/Environment:   Patient lives with .her sister  . . in a 1 story house with 1 step in the garage    Prior Level of Function:  From initial eval:        Functional Complaints/ Current Level of Function:         Patient goal for therapy: 8/10/18 walk better. live on my own. Have better balance.             11/3/17Be able to hold my head up more and walk better and not turn my foot in as much        Exercises:   Exercise/Equipment Resistance/Repetitions Other comments    12/19/16 cerv rot left  x5      Head,sh, elbow presses Hold 5 sec  X 5      Ankle- DF/PF  INV/EV 2x10     12/21/16 bridges           SLR       Dorsi flx       Heel raise     12/28/16 seated- core work- upper andlower trunk, cue for head position constantly , sit to stand, unlock the left knee, shift to the left 30 min      Manual- see below 15 min      gait   15 min  Attempted st cane- not able due to fear of falling Will work in llbars   Left foot- seated active eversion    2x10 and against resistance. 1/2/17-1/6/17 gait-llbars    30 min Foot placement, posture, head position, wt shift Feet apart, left foot out.     1/10/17-1/13/17 gait in ll bars 15 min          SLR, abd, heel raise 3x5 work on midline with head and left toe position. scap retraction, depression Sets of 5 x 5      Side step ups-left 2x10      1/24/17 sidelying left cerv side lift, abd of hip, and eversion of left ankle All 3x 5     3/31/17-4/14/17 forward and lateral step ups to the left   x30 with cues for wt shift, able to step forward without hands x 5 reps. Working on shifting wt left   Standing side leg raise and backwd rsies 3x5    4/5/17 bridges, bicycle legs, single leg bridge 2x10 each, given HO for HEP    4/19/17 outdoor walking on concrete, gravel,grass 300 ft x 2. SB asst, no cane. Only used cane on the gravel and grass (20 ft) A few stops to get centered and reposition head   4/28/17 combined step ups- 4-6 8 inch forward and step up15 min  and overs without UE support, doing saumya pad steps Working on wt shift to the left. Seated on therapy ball-wt shift, knee lift, low kick, arm raise, trunk co-contraction x10-20 reps each    Ambulating on the sidewalk 200 ft            Left foot eversion with red 3x10    5/5/17 HEP: turn circles in 4 sec, single stand, tandem stand 2x day   gvien HO     11/3/17-1/2/18 step ups- no hands6 in  3 in a row, many times Improved wt shifts     Turn her left foot into eversion Sets of 10 x 3    sidelying -lateral neck lifts 3x10    Prone cerv ext 3x10    Prone leg ext, then with R arm raise 3x10    Prone plank NV     . Prone knee flx to stretch ant thigh Hold 10 sec  5-7 each leg. Left more limited.     Prone 4 pt   Elbows ad knees 2x10     torso stretching/ M-F release with 2 persons     Prone thoracic/cervical mobiliz costotransv jts, rot. Gait ex- step ups, rockerboard, stepping over small cone/wt, bosu          3/2/18 -5/22/18 gait- wt shifting , stepping over cone, stepping onto saumya pads walking, around obstacles  15  min    neuromusc-balance skills    min     8/14/18 - 9/11//18- 10/26/18 balance/gait- alternating steps on 4 in, wt shifting, high marching in place and traveling, wt shifting, static balance with hi knee      15 min                       Therapeutic Exercise:   15    min  ,    Stretching and extremity ex   . resissted scapular ex. Left UE ex.-ceiling punch, tricep, shoulder IR/ER. Will work on [employer] abd/add. Light finger/hand ROM            Group Therapy:      Home Exercise Program:       Therapeutic Activity:      Neuromuscular Re-education       min     balance. Co-trunk contractions. In standing. Able to punch left arm forward with wt shift, alternating punches in standing. Beach ball too and catch with shift to the left. Stepping over cones with left, serpentine walk between cones. re eval:      Gait:      15 min     Wt shift focus. Turning her left toe out, head back and central.          Manual Therapy:       30   min  ,cervical distraction, stretcing, mobiliz for rot, M-F release for left trunk and axilla.,, left costotransv jts. . sidelying left scapular mobiliz and resisted ex  3c5 with retraction and depression. 5/26/17  30 min manual cerv distraction, stretching, PROM, 1st rib, thoracic stretch,  sttretcing and M-F release with sidelying over the red therapy ball. .              Modalities:      Timed Code Treatment Minutes:     60        Charges:   1ex,   2  Man,     NM     1 gait    Total Treatment Minutes:        60         Treatment/Activity Tolerance:    [x] Patient tolerated treatment well [] Patient limited by fatigue   [] Patient limited by pain- left shoulder and elbow/forearm.  [x] Patient limited better with her uuper trunk posture/cervical posture. Able to tolerate resisted uppoer trun ex.   18  Reno/56, she has imporved 3 points in the difficult areas- tandem, single stand, and alternating foot on a step. 8/10 18     Gait: She continues to have difficulty keeping her head up- usually flexed and rot R. Her left shoulder girdle is protracted from holding it against her body for years and the left hip is retracted, also has extensor tone in her left LE with left foot adduction and supination even with an OTC AFO. She has decreased wt shift onto her left leg. RENO test:  44/56 possible. ( 41-56 is considered a low risk of falls)Low risk of falls. Most limited with wt shifting for placing her feet alternating on a step, standing in tandem , single stand,  turning a [place] that requires wt shifting. MMT:               Left                Right  Hip flx                   4-                    5         ext                  4-                    4+         abd                 4                      4+         Add                 3+                   4          IR                4 to 4+               5          ER                 3+                 4 to 4+  Knee flx                4-                     5            Ext               4-                     5  Dorsi flx                4-                     5  Plant flx                 3                      4  Inversion               4+                    5  Eversion                3+                    5     Balance:  Single stand:  Left  3 sec   Right   8 sec. Tandem:  Left  19 sec     Right  8 sec                    Assessment:10/26/ working towards a custom AFO to imporve gait and risk of falls. has made balance and wt shifting improvements that help with her walking and risk of falls.   She has been able to imporve the posture of her left arm- more at her side instead of against her  Body with some improved functional

## 2018-10-30 ENCOUNTER — HOSPITAL ENCOUNTER (OUTPATIENT)
Dept: PHYSICAL THERAPY | Age: 53
Setting detail: THERAPIES SERIES
Discharge: HOME | End: 2018-10-30
Payer: COMMERCIAL

## 2018-10-30 PROCEDURE — 97140 MANUAL THERAPY 1/> REGIONS: CPT

## 2018-10-30 PROCEDURE — 97110 THERAPEUTIC EXERCISES: CPT

## 2018-10-30 NOTE — FLOWSHEET NOTE
today 5-6/10. On her last visit she reported 4/10 pain. 3/23/18 she is excited about her new dentures. She will have them next thrus. 3/14/18 difficult day yesterday  3/9/18 doing OK.  3/2/18 doing OK  2/28/18 doing OK but frustrated with not living on her own.  2/19/18 pt returns for therapy. Last seen almost 3 weeks ago. 1/31/18 reports she had a cervical injection that was helpful 2 days ago. She continues to use her AFO brace every day and use her soft cervical collar  1/16/18 therapist running 12 min late. She is doing OK  1/12/18 doing OK. Has been using her AFO brace- using it on the outside of her pants. 1/5/18 dong about the same. 1/2/18 states she tripped in the kitchen, caught herself on the sink. 12/29/17 voice is strong today  12/26/17 th eleft arm is more painful today- it is very cold outside and sometimes this bothers her more  12/15/17 doing better with her left arm pain today. Back to 5/10  12/12/17 fatigued today- did not sleep well. Her left arm pain is high today- burning pain. 12/8/17 pt is very pleased today. She used the [employer] for transportation- pleased to be more independent. 12/5/17 reports she is doing OK. Pt is frustrated with her home situation. 12/1/17 doing OK      Pain        Social History/Environment:   Patient lives with .her sister  . . in a 1 story house with 1 step in the garage    Prior Level of Function:  From initial eval:        Functional Complaints/ Current Level of Function:         Patient goal for therapy: 8/10/18 walk better. live on my own. Have better balance.             11/3/17Be able to hold my head up more and walk better and not turn my foot in as much        Exercises:   Exercise/Equipment Resistance/Repetitions Other comments    12/19/16 cerv rot left  x5      Head,sh, elbow presses Hold 5 sec  X 5      Ankle- DF/PF  INV/EV 2x10     12/21/16 bridges           SLR       Dorsi flx       Heel raise     12/28/16 seated- core work- upper andlower better with her uuper trunk posture/cervical posture. Able to tolerate resisted uppoer trun ex.   18  Reno/56, she has imporved 3 points in the difficult areas- tandem, single stand, and alternating foot on a step. 8/10 18     Gait: She continues to have difficulty keeping her head up- usually flexed and rot R. Her left shoulder girdle is protracted from holding it against her body for years and the left hip is retracted, also has extensor tone in her left LE with left foot adduction and supination even with an OTC AFO. She has decreased wt shift onto her left leg. RENO test:  44/56 possible. ( 41-56 is considered a low risk of falls)Low risk of falls. Most limited with wt shifting for placing her feet alternating on a step, standing in tandem , single stand,  turning a [place] that requires wt shifting. MMT:               Left                Right  Hip flx                   4-                    5         ext                  4-                    4+         abd                 4                      4+         Add                 3+                   4          IR                4 to 4+               5          ER                 3+                 4 to 4+  Knee flx                4-                     5            Ext               4-                     5  Dorsi flx                4-                     5  Plant flx                 3                      4  Inversion               4+                    5  Eversion                3+                    5     Balance:  Single stand:  Left  3 sec   Right   8 sec. Tandem:  Left  19 sec     Right  8 sec                    Assessment:10/26/ working towards a custom AFO to imporve gait and risk of falls. has made balance and wt shifting improvements that help with her walking and risk of falls.   She has been able to imporve the posture of her left arm- more at her side instead of against her  Body with some improved functional

## 2018-11-02 ENCOUNTER — APPOINTMENT (OUTPATIENT)
Dept: PHYSICAL THERAPY | Age: 53
End: 2018-11-02
Payer: COMMERCIAL

## 2018-11-06 ENCOUNTER — HOSPITAL ENCOUNTER (OUTPATIENT)
Dept: PHYSICAL THERAPY | Age: 53
Setting detail: THERAPIES SERIES
Discharge: HOME | End: 2018-11-06
Payer: COMMERCIAL

## 2018-11-06 PROCEDURE — 97110 THERAPEUTIC EXERCISES: CPT

## 2018-11-06 PROCEDURE — 97116 GAIT TRAINING THERAPY: CPT

## 2018-11-06 PROCEDURE — 97140 MANUAL THERAPY 1/> REGIONS: CPT

## 2018-11-06 NOTE — PROGRESS NOTES
the W/C. Left arm pain scale 6/10 today ( higher than usual)  8/31/18 more upbeat today  8/27/18 not as stressed today. 8/24/18 reports she is stressed today. Has her ankle wt and neck collar on. Not wearing her AFO today. 8/21/18 has her ankle wt today, not wearing her AFO  brace. 8/13/18 doing about the same. Didn't wear her AFO or ankle weight. She will start wearing them again. 8/10/18 pt is returning to re start therapy. Last seen 5/22/18. Pain scale above. doing about the same. Stressed with her home life. She had a new neurotransmitter implanted 7/31/18. She is pleased as it helps her pain level. 5/22/18 last seen 11 days ago. Has had transportation problems and therapist vacation. She is still stressed at home. States she is sitting on her therapy ball 2x day, working on wt shifts and balance. Pain        Social History/Environment:   Patient lives with .her sister  . . in a 1 story house with 1 step in the garage    Prior Level of Function:  From initial eval:        Functional Complaints/ Current Level of Function:         Patient goal for therapy:  11/6/18 walk better. live on my own. Have better balance. 11/3/17Be able to hold my head up more and walk better and not turn my foot in as much        Exercises:   Exercise/Equipment Resistance/Repetitions Other comments    12/19/16 cerv rot left  x5      Head,sh, elbow presses Hold 5 sec  X 5      Ankle- DF/PF  INV/EV 2x10     12/21/16 bridges           SLR       Dorsi flx       Heel raise     12/28/16 seated- core work- upper andlower trunk, cue for head position constantly , sit to stand, unlock the left knee, shift to the left 30 min      Manual- see below 15 min      gait   15 min  Attempted st cane- not able due to fear of falling Will work in llbars   Left foot- seated active eversion    2x10 and against resistance.     1/2/17-1/6/17 gait-llbars    30 min Foot placement, posture, head position, wt shift Feet apart, 9/11//18- 11/6/18 balance/gait- alternating steps on 4 in, wt shifting, high marching in place and traveling, wt shifting, static balance with hi knee      15 min                       Therapeutic Exercise:   15    min  ,    Stretching and extremity ex   . resissted scapular ex. Left UE ex. PNF patterns. ceiling punch, tricep, shoulder IR/ER. Will work on [employer] abd/add. Light finger/hand ROM            Group Therapy:      Home Exercise Program:       Therapeutic Activity:      Neuromuscular Re-education       min     balance. Co-trunk contractions. In standing. Able to punch left arm forward with wt shift, alternating punches in standing. Beach ball too and catch with shift to the left. Stepping over cones with left, serpentine walk between cones. re eval:      Gait:   10    min     Wt shift focus. Turning her left toe out, head back and central.          Manual Therapy:       30   min  ,cervical distraction, stretcing, mobiliz for rot, M-F release for left trunk and axilla.,, left costotransv jts. . sidelying left scapular mobiliz and resisted ex  3c5 with retraction and depression. LE MMT today       5/26/17  30 min manual cerv distraction, stretching, PROM, 1st rib, thoracic stretch,  sttretcing and M-F release with sidelying over the red therapy ball. .              Modalities:      Timed Code Treatment Minutes:       55       Charges:   1ex,   2  Man,          1 gait    Total Treatment Minutes:       65         Treatment/Activity Tolerance:    [x] Patient tolerated treatment well [] Patient limited by fatigue   [] Patient limited by pain- left shoulder and elbow/forearm.  [x] Patient limited by other medical complications-RSD  [] Other:     Prognosis: [x] Good [] Fair  [] Poor    Patient Requires Follow-up:  [x] Yes  [] No    Plan: [x] Continue per plan of care -        [] Alter current plan (see comments)   [] Plan of care initiated [] Hold pending MD visit- needs new C9 [] Discharge          See with ambulation. Will be contacting orthotist to fabricate custom AFO. Her left UE use is slowly improving. Tandem stand and single stand imporved by 2 sec. 10/26/ working towards a custom AFO to imporve gait and risk of falls. has made balance and wt shifting improvements that help with her walking and risk of falls. She has been able to imporve the posture of her left arm- more at her side instead of against her  Body with some improved functional use. G Code:  CJ  Summary:  .      Seen x 15  visits in the past several months. Visits had to be more sporadic this session due to pt and therapist vacation, transportation difficulties and MD conflicts. Patient's response to treatment: good. Works extremely hard,   . She would like to eventually return to her own home in the near future. She would like to be more independent with her self care/grooming, and medications     Goals:  G code :  CI  Short Term Goals ( 4 weeks) Long Term Goals ( 8 weeks)   1). Establish HEP 1). (I) HEP   2). imporive cervical position 35% 2). improve cervical position  75%   3). cervical strength to 4-. Improve LE strength 1/3 mmt 3). cervical strength to 4+. Improve LE MMT 2/3 MMT   4). tandem stand left  15 sec  Right 20 sec. Single stnd 5 sec 4). good balance. Mir score to 53-55/56   5). Ambulate for 10 min continuously 5). ambulate continuously for 20 min   6). Consider custom AFO 6). increase activiy  75%         Progress toward previous goals:   STG 1,2,3,4 (partially)5 met. Working toward goal 6. ·   ·   Plan:    Continue PT 2x wk for the   6-8 more sessions.   Will have to have the date extended to mid Dec         Electronically Signed by: Tristin Humphrey, [id number]

## 2018-11-09 ENCOUNTER — APPOINTMENT (OUTPATIENT)
Dept: PHYSICAL THERAPY | Age: 53
End: 2018-11-09
Payer: COMMERCIAL

## 2018-11-13 ENCOUNTER — HOSPITAL ENCOUNTER (OUTPATIENT)
Dept: PHYSICAL THERAPY | Age: 53
Setting detail: THERAPIES SERIES
Discharge: HOME | End: 2018-11-13
Payer: COMMERCIAL

## 2018-11-13 PROCEDURE — 97112 NEUROMUSCULAR REEDUCATION: CPT

## 2018-11-13 PROCEDURE — 97116 GAIT TRAINING THERAPY: CPT

## 2018-11-13 PROCEDURE — 97140 MANUAL THERAPY 1/> REGIONS: CPT

## 2018-11-13 PROCEDURE — 97110 THERAPEUTIC EXERCISES: CPT

## 2018-11-13 NOTE — PROGRESS NOTES
has 9 days till her trip to see her parents- hasn't seen them since she was in the W/C. Left arm pain scale 6/10 today ( higher than usual)  8/31/18 more upbeat today  8/27/18 not as stressed today. 8/24/18 reports she is stressed today. Has her ankle wt and neck collar on. Not wearing her AFO today. 8/21/18 has her ankle wt today, not wearing her AFO  brace. 8/13/18 doing about the same. Didn't wear her AFO or ankle weight. She will start wearing them again. 8/10/18 pt is returning to re start therapy. Last seen 5/22/18. Pain scale above. doing about the same. Stressed with her home life. She had a new neurotransmitter implanted 7/31/18. She is pleased as it helps her pain level. 5/22/18 last seen 11 days ago. Has had transportation problems and therapist vacation. She is still stressed at home. States she is sitting on her therapy ball 2x day, working on wt shifts and balance. Pain        Social History/Environment:   Patient lives with .her sister  . . in a 1 story house with 1 step in the garage    Prior Level of Function:  From initial eval:        Functional Complaints/ Current Level of Function:         Patient goal for therapy:  11/6/18 walk better. live on my own. Have better balance. 11/3/17Be able to hold my head up more and walk better and not turn my foot in as much        Exercises:   Exercise/Equipment Resistance/Repetitions Other comments    12/19/16 cerv rot left  x5      Head,sh, elbow presses Hold 5 sec  X 5      Ankle- DF/PF  INV/EV 2x10     12/21/16 bridges           SLR       Dorsi flx       Heel raise     12/28/16 seated- core work- upper andlower trunk, cue for head position constantly , sit to stand, unlock the left knee, shift to the left 30 min      Manual- see below 15 min      gait   15 min  Attempted st cane- not able due to fear of falling Will work in llbars   Left foot- seated active eversion    2x10 and against resistance.     1/2/17-1/6/17 around obstacles  15  min    neuromusc-balance skills       8/14/18 - 9/11//18- 11/13/18 balance/gait- alternating steps on 4 in, wt shifting, high marching in place and traveling, wt shifting, static balance with hi knee, ball toss with leaning to the left, kick ball with R foot      15min                       Therapeutic Exercise:   15    min  ,    Stretching and extremity ex   . resissted scapular ex. Left UE ex. PNF patterns. ceiling punch, tricep, shoulder IR/ER. Will work on [employer] abd/add. Light finger/hand ROM            Group Therapy:      Home Exercise Program:       Therapeutic Activity:      Neuromuscular Re-education      15 min     balance. Co-trunk contractions. In standing. Able to punch left arm forward with wt shift, alternating punches in standing. Beach ball too and catch with shift to the left. Stepping over cones with left, serpentine walk between cones. re eval:      Gait:   1 15   min     Wt shift focus. Turning her left toe out, head back and central.          Manual Therapy:      15  min  ,cervical distraction, stretcing, mobiliz for rot, M-F release for left trunk and axilla.,, left costotransv jts. . sidelying left scapular mobiliz and resisted ex  3c5 with retraction and depression. LE MMT today       5/26/17  30 min manual cerv distraction, stretching, PROM, 1st rib, thoracic stretch,  sttretcing and M-F release with sidelying over the red therapy ball. .              Modalities:      Timed Code Treatment Minutes:        60       Charges:   1ex,   1  Man,      1 NM,    1 gait    Total Treatment Minutes:       65         Treatment/Activity Tolerance:    [x] Patient tolerated treatment well [] Patient limited by fatigue   [] Patient limited by pain- left shoulder and elbow/forearm.  [x] Patient limited by other medical complications-RSD  [] Other:     Prognosis: [x] Good [] Fair  [] Poor    Patient Requires Follow-up:  [x] Yes  [] No    Plan: [x] Continue per plan of care - has improved in almost all areas. Her increased LE tone continues to over ride her active eversion into excessive inversion that is a fall risk with ambulation. Will be contacting orthotist to fabricate custom AFO. Her left UE use is slowly improving. Tandem stand and single stand imporved by 2 sec. 10/26/ working towards a custom AFO to imporve gait and risk of falls. has made balance and wt shifting improvements that help with her walking and risk of falls. She has been able to imporve the posture of her left arm- more at her side instead of against her  Body with some improved functional use. G Code:    Summary:  .      Seen x 15  visits in the past several months. Visits had to be more sporadic this session due to pt and therapist vacation, transportation difficulties and MD conflicts. Patient's response to treatment: good. Works extremely hard,   . She would like to eventually return to her own home in the near future. She would like to be more independent with her self care/grooming, and medications     Goals:  G code :  CI  Short Term Goals ( 4 weeks) Long Term Goals ( 8 weeks)   1). Establish HEP 1). (I) HEP   2). imporive cervical position 35% 2). improve cervical position  75%   3). cervical strength to 4-. Improve LE strength 1/3 mmt 3). cervical strength to 4+. Improve LE MMT 2/3 MMT   4). tandem stand left  15 sec  Right 20 sec. Single stnd 5 sec 4). good balance. Mir score to 53-55/56   5). Ambulate for 10 min continuously 5). ambulate continuously for 20 min   6). Consider custom AFO 6). increase activiy  75%         Progress toward previous goals:   STG 1,2,3,4 (partially)5 met. Working toward goal 6. ·   ·   Plan:    Continue PT 2x wk for the   6-8 more sessions.   Will have to have the date extended to mid Dec         Electronically Signed by: Will Montoya, [id number]

## 2018-11-20 ENCOUNTER — HOSPITAL ENCOUNTER (OUTPATIENT)
Dept: PHYSICAL THERAPY | Age: 53
Setting detail: THERAPIES SERIES
Discharge: HOME | End: 2018-11-20
Payer: COMMERCIAL

## 2018-11-20 PROCEDURE — 97140 MANUAL THERAPY 1/> REGIONS: CPT

## 2018-11-20 PROCEDURE — 97116 GAIT TRAINING THERAPY: CPT

## 2018-11-20 PROCEDURE — 97110 THERAPEUTIC EXERCISES: CPT

## 2018-11-20 PROCEDURE — 97112 NEUROMUSCULAR REEDUCATION: CPT

## 2018-11-20 NOTE — FLOWSHEET NOTE
Outpatient Physical Therapy       Daily Treatment Note   [] Progress Note -   8/10/10 re start. - Bottom of this page, 18. 10/26/18,  18 bottom of this page [] Discharge Note      Date:  2018        Patient Name:  Drew Leung        :  1965    Restrictions/Precautions:      Diagnosis:  Complex pain syndrome. Treatment Diagnosis:  Same, RSD, decreased ability to walk, inability to use her left UE, decreased balance  Insurance/Certification information:  [facility] has approval for 8 weeks- 16 visits                                                          Referring Physician:  Dr. Marquez Ramires of care signed (Y/N):      Visit# / total visits: -24                Authorized til    2-3 x wk for  8 weeks  18-    2018    Pain level: 4-5/10 neck when lying down       4/10 neck, with walking, ADLs         4/10  Constant left arm(entire  Arm)     Left le-4/10 at her hip,thigh, foot                                                                                                                                                1/10  R arm                                        Right le/10 hip and thigh      Subjective: 18 stressed over her home situation, can't wait to return to [facility]  18 reports she is anxious to return to [place] to visit her family  18 reports her pain level is 4/10. She has to cancel Friday due to conflict with a MD appt  10/30/18 doing OK. Pleased about a family situation  10/26/18 frustrated with  Her home situation. Left arm pain 5/10   10/23/18 left arm  4-5/10  10/19/18 doing OK today. Left arm pain 4/10  10/16/18 pt is pleased that she is going back to [place] in mid Dec for lucian til mid [place]  10/12/18 pt returns after being out of town for 3 weeks. She had a great trip visiting her family in [facility]. The first time that she has been able to travel in several yeares. .  18 doing well today.  She sec     Right  11 sec                    Assessment: 11/6/18 LE strength has improved in almost all areas. Her increased LE tone continues to over ride her active eversion into excessive inversion that is a fall risk with ambulation. Will be contacting orthotist to fabricate custom AFO. Her left UE use is slowly improving. Tandem stand and single stand imporved by 2 sec. 10/26/ working towards a custom AFO to imporve gait and risk of falls. has made balance and wt shifting improvements that help with her walking and risk of falls. She has been able to imporve the posture of her left arm- more at her side instead of against her  Body with some improved functional use. G Code:  CJ  Summary:  .      Seen x 15  visits in the past several months. Visits had to be more sporadic this session due to pt and therapist vacation, transportation difficulties and MD conflicts. Patient's response to treatment: good. Works extremely hard,   . She would like to eventually return to her own home in the near future. She would like to be more independent with her self care/grooming, and medications     Goals:  G code :  CI  Short Term Goals ( 4 weeks) Long Term Goals ( 8 weeks)   1). Establish HEP 1). (I) HEP   2). imporive cervical position 35% 2). improve cervical position  75%   3). cervical strength to 4-. Improve LE strength 1/3 mmt 3). cervical strength to 4+. Improve LE MMT 2/3 MMT   4). tandem stand left  15 sec  Right 20 sec. Single stnd 5 sec 4). good balance. Mir score to 53-55/56   5). Ambulate for 10 min continuously 5). ambulate continuously for 20 min   6). Consider custom AFO 6). increase activiy  75%         Progress toward previous goals:   STG 1,2,3,4 (partially)5 met. Working toward goal 6. ·   ·   Plan:    Continue PT 2x wk for the   6-8 more sessions.   Will have to have the date extended to mid Dec         Electronically Signed by: Karlie Briones [id number]

## 2018-11-27 ENCOUNTER — HOSPITAL ENCOUNTER (OUTPATIENT)
Dept: PHYSICAL THERAPY | Age: 53
Setting detail: THERAPIES SERIES
Discharge: HOME | End: 2018-11-27
Payer: COMMERCIAL

## 2018-12-04 ENCOUNTER — APPOINTMENT (OUTPATIENT)
Dept: PHYSICAL THERAPY | Age: 53
End: 2018-12-04
Payer: COMMERCIAL

## 2018-12-07 ENCOUNTER — HOSPITAL ENCOUNTER (OUTPATIENT)
Dept: PHYSICAL THERAPY | Age: 53
Setting detail: THERAPIES SERIES
Discharge: HOME | End: 2018-12-07
Payer: COMMERCIAL

## 2018-12-07 PROCEDURE — 97140 MANUAL THERAPY 1/> REGIONS: CPT

## 2018-12-07 PROCEDURE — 97110 THERAPEUTIC EXERCISES: CPT

## 2018-12-07 NOTE — FLOWSHEET NOTE
the worst.  Has minimal use of her left arm  18 able to use her therapy ball at home for 2 sessions a day, about 15 min each. Working on trunk control, strength, midline positioning. She would like to be more independent with her grooming and medications at home. .      Objective: 18  Reno Score:  46/56       Timed up and go: 15 sec. MMT below. Much improve. d  10/26/18  Reno 47/56. Doing better with her uuper trunk posture/cervical posture. Able to tolerate resisted uppoer trun ex.   18  Reno/56, she has imporved 3 points in the difficult areas- tandem, single stand, and alternating foot on a step. 8/10 18     Gait: She continues to have difficulty keeping her head up- usually flexed and rot R. Her left shoulder girdle is protracted from holding it against her body for years and the left hip is retracted, also has extensor tone in her left LE with left foot adduction and supination even with an OTC AFO. She has decreased wt shift onto her left leg. RENO test:  44/56 possible. ( 41-56 is considered a low risk of falls)Low risk of falls. Most limited with wt shifting for placing her feet alternating on a step, standing in tandem , single stand,  turning a [place] that requires wt shifting.        18     MMT:               Left                      Right  Hip flx                   4+                  5         ext                  4-                         4+         abd                 4+                  4+         Add              4                   4          IR                4 to 4+                    5          ER                 3+                       4 to 4+  Knee flx                4+                  5            Ext               4+                  5  Dorsi flx               4+                   5  Plant flx                 3                           4  Inversion               5                   5  Eversion                3+  4 5     Her left LE strength has imporved in almost all areas. Balance:  Single stand:  Left  3 sec   Right   10 sec. Tandem:  Left  19 sec     Right  11 sec                    Assessment: 11/6/18 LE strength has improved in almost all areas. Her increased LE tone continues to over ride her active eversion into excessive inversion that is a fall risk with ambulation. Will be contacting orthotist to fabricate custom AFO. Her left UE use is slowly improving. Tandem stand and single stand imporved by 2 sec. 10/26/ working towards a custom AFO to imporve gait and risk of falls. has made balance and wt shifting improvements that help with her walking and risk of falls. She has been able to imporve the posture of her left arm- more at her side instead of against her  Body with some improved functional use. G Code:  CJ  Summary:  .      Seen x 15  visits in the past several months. Visits had to be more sporadic this session due to pt and therapist vacation, transportation difficulties and MD conflicts. Patient's response to treatment: good. Works extremely hard,   . She would like to eventually return to her own home in the near future. She would like to be more independent with her self care/grooming, and medications     Goals:  G code :  CI  Short Term Goals ( 4 weeks) Long Term Goals ( 8 weeks)   1). Establish HEP 1). (I) HEP   2). imporive cervical position 35% 2). improve cervical position  75%   3). cervical strength to 4-. Improve LE strength 1/3 mmt 3). cervical strength to 4+. Improve LE MMT 2/3 MMT   4). tandem stand left  15 sec  Right 20 sec. Single stnd 5 sec 4). good balance. Mir score to 53-55/56   5). Ambulate for 10 min continuously 5). ambulate continuously for 20 min   6). Consider custom AFO 6). increase activiy  75%         Progress toward previous goals:   STG 1,2,3,4 (partially)5 met. Working toward goal 6.   ·   ·   Plan:    Continue PT 2x wk for the   6-8 more

## 2018-12-11 ENCOUNTER — HOSPITAL ENCOUNTER (OUTPATIENT)
Dept: PHYSICAL THERAPY | Age: 53
Setting detail: THERAPIES SERIES
Discharge: HOME | End: 2018-12-11
Payer: COMMERCIAL

## 2018-12-11 PROCEDURE — 97116 GAIT TRAINING THERAPY: CPT

## 2018-12-11 PROCEDURE — 97140 MANUAL THERAPY 1/> REGIONS: CPT

## 2018-12-11 PROCEDURE — 97112 NEUROMUSCULAR REEDUCATION: CPT

## 2018-12-11 PROCEDURE — 97110 THERAPEUTIC EXERCISES: CPT

## 2018-12-11 NOTE — PROGRESS NOTES
for lucian chisholm [employer]  10/12/18 pt returns after being out of town for 3 weeks. She had a great trip visiting her family in [facility]. The first time that she has been able to travel in several yeares. .  9/11/18 doing well today. She is so excited to see her parents! Leaves on thurs. 9/4/18 excited that she has 9 days till her trip to see her parents- hasn't seen them since she was in the [place] [id number]. Left arm pain scale 6/10 today ( higher than usual)  8/31/18 more upbeat today  8/27/18 not as stressed today. 8/24/18 reports she is stressed today. Has her ankle wt and neck collar on. Not wearing her AFO today. 8/21/18 has her ankle wt today, not wearing her AFO  brace. 8/13/18 doing about the same. Didn't wear her AFO or ankle weight. She will start wearing them again. 8/10/18 pt is returning to re start therapy. Last seen 5/22/18. Pain scale above. doing about the same. Stressed with her home life. She had a new neurotransmitter implanted 7/31/18. She is pleased as it helps her pain level. 5/22/18 last seen 11 days ago. Has had transportation problems and therapist vacation. She is still stressed at home. States she is sitting on her therapy ball 2x day, working on wt shifts and balance. Pain        Social History/Environment:   Patient lives with .her sister  . . in a 1 story house with 1 step in the garage    Prior Level of Function:  From initial eval:        Functional Complaints/ Current Level of Function:         Patient goal for therapy:  11/6/18 walk better. live on my own. Have better balance.             11/3/17Be able to hold my head up more and walk better and not turn my foot in as much        Exercises:   Exercise/Equipment Resistance/Repetitions Other comments    12/19/16 cerv rot left  x5      Head,sh, elbow presses Hold 5 sec  X 5      Ankle- DF/PF  INV/EV 2x10     12/21/16 bridges           SLR       Dorsi flx       Heel raise     12/28/16 seated- core work- upper to visit her mother and father. She was able to travel there for the first time in 5 years  In Oct 2018. She is pleased with her progress. She would like to be more independent with her self care and live alone. 18 continues with improved voice strength and overall affect. Has noticed improved use of her L UE to do small tasks with ADLs like zippers and buttons. She is able to use her thumb and indext finger for a few skills but not the rest of her hand. 10/26/18 doing better since her reutn from [facility]. She did more walking and talking. It is notceable in her voice volume and overall affect. 18 doing welo the past 2 visits. Pleased to be going to visit her parents in [place]. 8/10/18 pleased with new tens implant. Excited to visit her parents in 5 weeks( hasn't seen them in years). Having difficulty with ongoing pain- herneck,LB, and left UE are the worst.  Has minimal use of her left arm  18 able to use her therapy ball at home for 2 sessions a day, about 15 min each. Working on trunk control, strength, midline positioning. She would like to be more independent with her grooming and medications at home. .      Objective:18  Mir score: 47.56. Timed up and go: 13.5 sec. MMT: unchanged from 18- see below. Gait: improivng wt shifting to the left. Able to step up and down a 6 inch curb without her cane 75% of the time. 18  Mir Score:  46/56       Timed up and go: 15 sec. MMT below. Much improve. d  10/26/18  Mir 47/56. Doing better with her uuper trunk posture/cervical posture. Able to tolerate resisted uppoer trun ex.   18  Mir/56, she has imporved 3 points in the difficult areas- tandem, single stand, and alternating foot on a step. 8/10 18     Gait: She continues to have difficulty keeping her head up- usually flexed and rot R.  Her left shoulder girdle is protracted from holding it against her body for years and the left hip is retracted, also has extensor tone in her left LE with left foot adduction and supination even with an OTC AFO. She has decreased wt shift onto her left leg. RENO test:  44/56 possible. ( 41-56 is considered a low risk of falls)Low risk of falls. Most limited with wt shifting for placing her feet alternating on a step, standing in tandem , single stand,  turning a [employer] that requires wt shifting. 11/6/18     MMT:               Left                      Right  Hip flx                   4+                  5         ext                  4-                         4+         abd                 4+                  4+         Add              4                   4          IR                4 to 4+                    5          ER                 3+                       4 to 4+  Knee flx                4+                  5            Ext               4+                  5  Dorsi flx               4+                   5  Plant flx                 3                           4  Inversion               5                   5  Eversion                3+  4                    5     Her left LE strength has imporved in almost all areas. Balance:  Single stand:  Left  3 sec   Right   10 sec. Tandem:  Left  19 sec     Right  11 sec                    Assessment: 12/11/18 her gait is improving- speed and wt shifting to make her more independent with community curbs. Her tone in her left LE continues to over rifde her active eversion and Df of her left ankle and she needs a custom AFO for imporved safety. Have contacted an orthotist but she was not able to get there before her trip. Will pursue this in Jan. 2019.    11/6/18 LE strength has improved in almost all areas. Her increased LE tone continues to over ride her active eversion into excessive inversion that is a fall risk with ambulation. Will be contacting orthotist to fabricate custom AFO. Her left UE use is slowly improving.   Tandem